# Patient Record
Sex: MALE | Race: WHITE | NOT HISPANIC OR LATINO | Employment: UNEMPLOYED | URBAN - NONMETROPOLITAN AREA
[De-identification: names, ages, dates, MRNs, and addresses within clinical notes are randomized per-mention and may not be internally consistent; named-entity substitution may affect disease eponyms.]

---

## 2017-08-01 ENCOUNTER — TRANSCRIBE ORDERS (OUTPATIENT)
Dept: ADMINISTRATIVE | Facility: HOSPITAL | Age: 52
End: 2017-08-01

## 2017-08-01 DIAGNOSIS — K21.00 REFLUX ESOPHAGITIS: ICD-10-CM

## 2017-08-01 DIAGNOSIS — R13.12 OROPHARYNGEAL DYSPHAGIA: Primary | ICD-10-CM

## 2017-08-01 DIAGNOSIS — R11.0 NAUSEA: ICD-10-CM

## 2017-08-07 ENCOUNTER — HOSPITAL ENCOUNTER (OUTPATIENT)
Dept: GENERAL RADIOLOGY | Facility: HOSPITAL | Age: 52
Discharge: HOME | End: 2017-08-07

## 2017-08-07 ENCOUNTER — HOSPITAL ENCOUNTER (OUTPATIENT)
Dept: GENERAL RADIOLOGY | Facility: HOSPITAL | Age: 52
Discharge: HOME | End: 2017-08-07
Admitting: GENERAL PRACTICE

## 2017-08-07 DIAGNOSIS — R13.12 OROPHARYNGEAL DYSPHAGIA: ICD-10-CM

## 2017-08-07 DIAGNOSIS — K21.00 REFLUX ESOPHAGITIS: ICD-10-CM

## 2017-08-07 DIAGNOSIS — R11.0 NAUSEA: ICD-10-CM

## 2017-08-07 PROCEDURE — 74246 X-RAY XM UPR GI TRC 2CNTRST: CPT

## 2017-09-18 ENCOUNTER — OFFICE VISIT (OUTPATIENT)
Dept: NEUROSURGERY | Age: 52
End: 2017-09-18
Payer: COMMERCIAL

## 2017-09-18 VITALS
HEIGHT: 74 IN | DIASTOLIC BLOOD PRESSURE: 67 MMHG | BODY MASS INDEX: 26.82 KG/M2 | OXYGEN SATURATION: 98 % | WEIGHT: 209 LBS | SYSTOLIC BLOOD PRESSURE: 107 MMHG | HEART RATE: 60 BPM

## 2017-09-18 DIAGNOSIS — M54.2 NECK PAIN: ICD-10-CM

## 2017-09-18 DIAGNOSIS — R13.10 DYSPHAGIA, UNSPECIFIED TYPE: ICD-10-CM

## 2017-09-18 DIAGNOSIS — R13.10 DYSPHAGIA, UNSPECIFIED TYPE: Primary | ICD-10-CM

## 2017-09-18 DIAGNOSIS — R53.1 WEAKNESS: ICD-10-CM

## 2017-09-18 LAB
ALBUMIN SERPL-MCNC: 4.1 G/DL (ref 3.5–5.2)
ALP BLD-CCNC: 79 U/L (ref 40–130)
ALT SERPL-CCNC: 19 U/L (ref 5–41)
ANION GAP SERPL CALCULATED.3IONS-SCNC: 16 MMOL/L (ref 7–19)
AST SERPL-CCNC: 14 U/L (ref 5–40)
BASOPHILS ABSOLUTE: 0 K/UL (ref 0–0.2)
BASOPHILS RELATIVE PERCENT: 0.2 % (ref 0–1)
BILIRUB SERPL-MCNC: 0.3 MG/DL (ref 0.2–1.2)
BUN BLDV-MCNC: 14 MG/DL (ref 6–20)
C-REACTIVE PROTEIN: 0.24 MG/DL (ref 0–0.5)
CALCIUM SERPL-MCNC: 9 MG/DL (ref 8.6–10)
CHLORIDE BLD-SCNC: 103 MMOL/L (ref 98–111)
CO2: 25 MMOL/L (ref 22–29)
CREAT SERPL-MCNC: 1.1 MG/DL (ref 0.5–1.2)
EOSINOPHILS ABSOLUTE: 0.4 K/UL (ref 0–0.6)
EOSINOPHILS RELATIVE PERCENT: 3.5 % (ref 0–5)
GFR NON-AFRICAN AMERICAN: >60
GLUCOSE BLD-MCNC: 76 MG/DL (ref 74–109)
HCT VFR BLD CALC: 44.4 % (ref 42–52)
HEMOGLOBIN: 14.9 G/DL (ref 14–18)
LYMPHOCYTES ABSOLUTE: 4 K/UL (ref 1.1–4.5)
LYMPHOCYTES RELATIVE PERCENT: 38.1 % (ref 20–40)
MCH RBC QN AUTO: 30.8 PG (ref 27–31)
MCHC RBC AUTO-ENTMCNC: 33.6 G/DL (ref 33–37)
MCV RBC AUTO: 91.7 FL (ref 80–94)
MONOCYTES ABSOLUTE: 0.7 K/UL (ref 0–0.9)
MONOCYTES RELATIVE PERCENT: 6.9 % (ref 0–10)
NEUTROPHILS ABSOLUTE: 5.3 K/UL (ref 1.5–7.5)
NEUTROPHILS RELATIVE PERCENT: 50.5 % (ref 50–65)
PDW BLD-RTO: 12.6 % (ref 11.5–14.5)
PLATELET # BLD: 209 K/UL (ref 130–400)
PMV BLD AUTO: 10.4 FL (ref 9.4–12.4)
POTASSIUM SERPL-SCNC: 4.3 MMOL/L (ref 3.5–5)
RBC # BLD: 4.84 M/UL (ref 4.7–6.1)
RHEUMATOID FACTOR: <10 IU/ML
SODIUM BLD-SCNC: 144 MMOL/L (ref 136–145)
T4 FREE: 1 NG/DL (ref 0.9–1.7)
TOTAL CK: 79 U/L (ref 39–308)
TOTAL PROTEIN: 7 G/DL (ref 6.6–8.7)
TSH SERPL DL<=0.05 MIU/L-ACNC: 4.06 UIU/ML (ref 0.27–4.2)
VITAMIN B-12: 368 PG/ML (ref 211–946)
WBC # BLD: 10.5 K/UL (ref 4.8–10.8)

## 2017-09-18 PROCEDURE — 99204 OFFICE O/P NEW MOD 45 MIN: CPT | Performed by: PSYCHIATRY & NEUROLOGY

## 2017-09-18 RX ORDER — HYDROCODONE BITARTRATE AND ACETAMINOPHEN 7.5; 325 MG/1; MG/1
1 TABLET ORAL PRN
COMMUNITY

## 2017-09-18 RX ORDER — PANTOPRAZOLE SODIUM 40 MG/1
40 TABLET, DELAYED RELEASE ORAL DAILY
COMMUNITY
Start: 2017-08-10

## 2017-09-18 RX ORDER — DICYCLOMINE HCL 20 MG
20 TABLET ORAL EVERY 6 HOURS
COMMUNITY
Start: 2017-07-21

## 2017-09-18 RX ORDER — DIAZEPAM 5 MG/1
5 TABLET ORAL EVERY 12 HOURS PRN
COMMUNITY
Start: 2017-08-23

## 2017-09-18 RX ORDER — PROMETHAZINE HYDROCHLORIDE 25 MG/1
25 TABLET ORAL EVERY 8 HOURS PRN
COMMUNITY
Start: 2017-08-23

## 2017-09-18 RX ORDER — ATENOLOL 100 MG/1
100 TABLET ORAL DAILY
COMMUNITY
Start: 2017-08-10

## 2017-09-20 LAB
ANA IGG, ELISA: NORMAL
ARSENIC BLOOD: <10 UG/L (ref 0–13)
LEAD LEVEL BLOOD: <2 UG/DL (ref 0–4.9)
MERCURY BLOOD: <3 UG/L (ref 0–10)

## 2017-10-06 LAB
ACETYLCHOLINE BINDING ANTIBODY: 0 NMOL/L (ref 0–0.4)
ACETYLCHOLINE BLOCKING AB: 20 % (ref 0–26)

## 2017-10-10 ENCOUNTER — TELEPHONE (OUTPATIENT)
Dept: NEUROLOGY | Age: 52
End: 2017-10-10

## 2017-10-10 NOTE — TELEPHONE ENCOUNTER
Patient called and advised that the hospital called him to let him know that his Insurance has denied the MRI scheduled for 10/12/17. Patient has asked if Dr. Kyle Garcia can appeal this denial. Could you please call the patient back to discuss, thank you.

## 2017-10-11 ENCOUNTER — TELEPHONE (OUTPATIENT)
Dept: NEUROLOGY | Age: 52
End: 2017-10-11

## 2017-10-12 ENCOUNTER — HOSPITAL ENCOUNTER (OUTPATIENT)
Dept: MRI IMAGING | Age: 52
Discharge: HOME | End: 2017-10-12
Payer: COMMERCIAL

## 2017-10-12 ENCOUNTER — HOSPITAL ENCOUNTER (OUTPATIENT)
Dept: NEUROLOGY | Age: 52
Discharge: HOME | End: 2017-10-12
Payer: COMMERCIAL

## 2017-10-12 DIAGNOSIS — M54.2 NECK PAIN: ICD-10-CM

## 2017-10-12 DIAGNOSIS — R53.1 WEAKNESS: ICD-10-CM

## 2017-10-12 DIAGNOSIS — R13.10 DYSPHAGIA, UNSPECIFIED TYPE: ICD-10-CM

## 2017-10-12 PROCEDURE — A9577 INJ MULTIHANCE: HCPCS | Performed by: PSYCHIATRY & NEUROLOGY

## 2017-10-12 PROCEDURE — 95910 NRV CNDJ TEST 7-8 STUDIES: CPT

## 2017-10-12 PROCEDURE — 72141 MRI NECK SPINE W/O DYE: CPT

## 2017-10-12 PROCEDURE — 70553 MRI BRAIN STEM W/O & W/DYE: CPT

## 2017-10-12 PROCEDURE — 95910 NRV CNDJ TEST 7-8 STUDIES: CPT | Performed by: PSYCHIATRY & NEUROLOGY

## 2017-10-12 PROCEDURE — 95886 MUSC TEST DONE W/N TEST COMP: CPT

## 2017-10-12 PROCEDURE — 6360000004 HC RX CONTRAST MEDICATION: Performed by: PSYCHIATRY & NEUROLOGY

## 2017-10-12 PROCEDURE — 95886 MUSC TEST DONE W/N TEST COMP: CPT | Performed by: PSYCHIATRY & NEUROLOGY

## 2017-10-12 RX ADMIN — GADOBENATE DIMEGLUMINE 20 ML: 529 INJECTION, SOLUTION INTRAVENOUS at 15:04

## 2017-10-12 NOTE — TELEPHONE ENCOUNTER
Called patient to confirm MRI denial. Patient states that when he received a call from [facility] to remind him of his test today, they informed him that it was denied. Patient states that he is due to have the test scheduled at 1:30pm today, which is after his 12:30pm nerve conduction study. Patient asked if I could help get this straightened out. I voiced yes, I will call the scheduling department and get some further insight and call him back, hopefully within the hour. Patient voiced ok and thank you. Dialed extension [phone]; [facility] pre access department. Spoke with Denis Muniz. I explained to her the situation and she stated that the patient tests are still on the schedule, she voiced that she cannot tell me whether or not they have been denied, I voiced thank you for your help. Called the patient back and provided him with this information. I advised the patient to go ahead and proceed with his plans to have this test performed and of he runs into any barriers along the way he can call the office and ask for me. Patient voiced ok.

## 2017-10-16 ENCOUNTER — TELEPHONE (OUTPATIENT)
Dept: NEUROLOGY | Age: 52
End: 2017-10-16

## 2017-10-16 NOTE — TELEPHONE ENCOUNTER
Patient had MRI's done and would like results. Patients insurance did not approve MRI's and patient is needing us to try and get it approved through insurance.

## 2017-11-01 ENCOUNTER — OFFICE VISIT (OUTPATIENT)
Dept: NEUROSURGERY | Age: 52
End: 2017-11-01
Payer: COMMERCIAL

## 2017-11-01 VITALS
WEIGHT: 214 LBS | DIASTOLIC BLOOD PRESSURE: 66 MMHG | OXYGEN SATURATION: 98 % | BODY MASS INDEX: 27.46 KG/M2 | HEART RATE: 58 BPM | SYSTOLIC BLOOD PRESSURE: 103 MMHG | HEIGHT: 74 IN

## 2017-11-01 DIAGNOSIS — M54.2 NECK PAIN: ICD-10-CM

## 2017-11-01 DIAGNOSIS — R13.10 DYSPHAGIA, UNSPECIFIED TYPE: Primary | ICD-10-CM

## 2017-11-01 DIAGNOSIS — R53.1 WEAKNESS: ICD-10-CM

## 2017-11-01 PROCEDURE — 99214 OFFICE O/P EST MOD 30 MIN: CPT | Performed by: PSYCHIATRY & NEUROLOGY

## 2017-11-01 NOTE — PROGRESS NOTES
[facility] Neurology Office Note      Patient:   Rachel Gibson  MR#:    [id number]  Account Number:                         YOB: 1965  Date of Evaluation:  11/1/2017  Time of Note:                          9:12 AM  Primary/Referring Physician:  JOVANA Valle   Consulting Physician:  Adams Post D.O.    FOLLOW UP VISIT    Chief Complaint   Patient presents with    Follow-up     6 week follow up for dysphagia, pt states that he is still having alot of trouble with swallowing    Neck Pain     pt states that he is still having alot of neck of pain       HISTORY OF Augusto Davies is a 47y.o. year old male here for possible dysphagia. Patient noting dysphagia, some difficulty with swallowing over the last few months. No overt speech changes. Does note some intermittent dizziness and nausea. No diplopia. No ptosis. Some neck pain noted, and possible right UE weakness intermittently but has not worsened. Has noted some possible fasciculations in the the upper extremities. He is s/p EGD and helped initially and then symptoms returned. Valium and phenergan seem to help. Workup completed and reviewed as below. No new complaints today. Past Medical History:   Diagnosis Date    Anxiety     GERD (gastroesophageal reflux disease)     Hypertension        Past Surgical History:   Procedure Laterality Date    BACK SURGERY      UPPER GASTROINTESTINAL ENDOSCOPY         Family History   Problem Relation Age of Onset    Diabetes Maternal Aunt     Diabetes Maternal Uncle     Cancer Paternal Aunt     Cancer Paternal Uncle     Cancer Paternal Grandmother        Social History     Social History    Marital status: Unknown     Spouse name: N/A    Number of children: N/A    Years of education: N/A     Occupational History    Not on file. Social History Main Topics    Smoking status: Former Smoker    Smokeless tobacco: Never Used    Alcohol use No    Drug use:  No  Sexual activity: Yes     Other Topics Concern    Not on file     Social History Narrative    No narrative on file       Current Outpatient Prescriptions   Medication Sig Dispense Refill    promethazine (PHENERGAN) 25 MG tablet 25 mg every 8 hours as needed       atenolol (TENORMIN) 100 MG tablet 100 mg daily       diazepam (VALIUM) 5 MG tablet 5 mg every 12 hours as needed       HYDROcodone-acetaminophen (NORCO) 7.5-325 MG per tablet Take 1 tablet by mouth as needed for Pain .  dicyclomine (BENTYL) 20 MG tablet 20 mg every 6 hours       pantoprazole (PROTONIX) 40 MG tablet 40 mg daily        No current facility-administered medications for this visit.       ALLERGIES  No Known Allergies      REVIEW OF SYSTEMS    Constitutional: []Fever []Sweats []Chills [] Recent Injury []Fatigue  [x] Denies all unless marked  HEENT:[]Headache  [] Head Injury  [] Sore Throat  [] Ear Pain  [x]Dizziness [] Hearing Loss []Trouble Swallowing []Voice Change  [] Eye Pain  [] Eye Injection []Visual Disturbance  [] Ptosis  [] Tinnitus [x] Denies all unless marked  Spine:  [x] Neck pain  [x] Back pain  [] Sciaticia  [x] Denies all unless marked  Cardiovascular:[]Chest Pain []Palpitations [] Heart Disease  [x] Denies all unless marked  Pulmonary: [x]Shortness of Breath []Cough  []Wheezing  [x] Denies all unless marked  Gastrointestinal:  []Abdominal Pain  []Blood in Stool  []Diarrhea []Constipation [x]Nausea  []Vomiting  [x] Denies all unless marked  Genitourinary:  [] Dysuria [] Enuresis [] Incontinence [] Frequency/Urgency  [] Hematuria  [x] Denies all unless marked  Musculoskeletal: [] Joint Pain [x] Myalgias [x] Joint Swelling [] Neck Stiffness  [x] Denies all unless marked  Skin:[] Rash [] Pallor [] Color Change [] Wound  [x] Denies all unless marked  Neurological:[] Visual Disturbance [] Double Vision [] Slurred Speech [x] Trouble swallowing  [] Vertigo [] Tingling [] Numbness [] Weakness [x] Loss of Balance [] Loss of Consciousness [] Memory Loss [x] Tremor [] Seizure [] Syncope  [] Ataxia  [x] Denies all unless marked  Psychiatric/Behavioral:[] Depression [x] Anxiety [] Confusion [] Agitation [] Behavior Problems  [] Hallucinations  [] Suicidal idiation  [x] Denies all unless marked  Sleep: []  Insomnia [] Sleep Disturbance [] Snoring [] Restless Legs [] Daytime Sleeping [] Sleep Apnea  [x] Denies all unless marked  Hematological:[] Adenopathy [] Bruises/Bleeds Easily  [x] Denies all unless marked  Endocrine: [] Cold Intolerance [] Heat Intolerance [] Polydipsia [] Polyphagia [] Polyuria  [x]Denies all unless marked  Allergic/Immunologic:[] Environmental Allergies [] Food Allergies [] Immunocompromised state  [x] Denies all unless marked    PHYSICAL EXAM  /66 (Site: Right Arm, Position: Sitting, Cuff Size: Large Adult)   Pulse 58   Ht 6' 2\" (1.88 m)   Wt 214 lb (97.1 kg)   SpO2 98%   BMI 27.48 kg/m²     Constitutional  No acute distress    HEENT- Conjunctiva normal.  No scars, masses, or lesions over external nose or ears  Musculoskeletal  No significant wasting of muscles noted, no bony deformities  Extremities - No clubbing, cyanosis or edema  Skin  Warm, dry, and intact. No rash, erythema, or pallor  Psychiatric  Mood, affect, and behavior appear normal      NEUROLOGICAL EXAM    Mental status   [x]Awake, alert, oriented   [x]Affect attention and concentration appear appropriate  [x]Recent and remote memory appears unremarkable  [x]Speech normal without dysarthria or aphasia, comprehension and repetition intact.    COMMENTS:    Cranial Nerves [x]No VF deficit to confrontation,  no papilledema on fundoscopic exam.  [x]PERRLA, EOMI, no nystagmus, conjugate eye movements, no ptosis  [x]Face symmetric  [x]Facial sensation intact  [x]Tongue midline no atrophy or fasciculations present  [x]Palate midline, hearing to finger rub normal bilaterally  [x]Shoulder shrug and SCM testing normal bilaterally  COMMENTS:   Motor [x]5/5 strength x 4 extremities  [x]Normal bulk and tone  [x]No tremor present  [x]No rigidity or bradykinesia noted  COMMENTS:   Sensory  [x]Sensation intact to light touch, pin prick, vibration, and proprioception BLE  [x]Sensation intact to light touch, pin prick, vibration, and proprioception BUE  COMMENTS:   Coordination [x]FTN normal bilaterally   [x]HTS normal bilaterally  [x]TRENT normal bilaterally. COMMENTS:   Reflexes  [x]Symmetric and non-pathological  [x]Toes down going bilaterally  [x]No clonus present  COMMENTS:   Gait                  [x]Normal steady gait    []Ataxic    []Spastic     []Magnetic     []Shuffling  COMMENTS:       LABS RECORD AND IMAGING REVIEW (As below and per HPI)    Records reviewed. MRI Brain negative. MRI C-spine with ddd, severe spinal stenosis noted. Anterior osteophytes noted as well. ASSESSMENT:    Saran Mar is a 47y.o. year old male here for dysphagia, neck pain, and intermittent UE weakness. MRI negative for central source fore dysphagia. MRI C-spine with severe spinal stenosis and anterior osteophytes noted, question if may be playing a role. Labs and NCS/EMG negative for neuromuscular source, MG antibodies negative, no evidence of MND on EMG. His exam is not overtly suggestive of ALS currently. PLAN:  1. Referral to neurosurgery for C-spine stenosis. 2.  Continue follow up with GI.   3.  Will follow here.      Nely Benites DO  Board Certified Neurology

## 2017-11-02 ENCOUNTER — TELEPHONE (OUTPATIENT)
Dept: NEUROSURGERY | Age: 52
End: 2017-11-02

## 2017-11-07 ENCOUNTER — TELEPHONE (OUTPATIENT)
Dept: NEUROLOGY | Age: 52
End: 2017-11-07

## 2017-11-15 ENCOUNTER — APPOINTMENT (OUTPATIENT)
Dept: PREADMISSION TESTING | Facility: HOSPITAL | Age: 52
End: 2017-11-15

## 2017-11-15 ENCOUNTER — HOSPITAL ENCOUNTER (OUTPATIENT)
Dept: GENERAL RADIOLOGY | Facility: HOSPITAL | Age: 52
Discharge: HOME | End: 2017-11-15
Admitting: ORTHOPAEDIC SURGERY

## 2017-11-15 VITALS
BODY MASS INDEX: 28.31 KG/M2 | SYSTOLIC BLOOD PRESSURE: 105 MMHG | DIASTOLIC BLOOD PRESSURE: 54 MMHG | WEIGHT: 213.6 LBS | RESPIRATION RATE: 18 BRPM | OXYGEN SATURATION: 99 % | HEIGHT: 73 IN | HEART RATE: 52 BPM

## 2017-11-15 LAB
ALBUMIN SERPL-MCNC: 4.1 G/DL (ref 3.5–5)
ALBUMIN/GLOB SERPL: 1.4 G/DL (ref 1.1–2.5)
ALP SERPL-CCNC: 61 U/L (ref 24–120)
ALT SERPL W P-5'-P-CCNC: 35 U/L (ref 0–54)
ANION GAP SERPL CALCULATED.3IONS-SCNC: 10 MMOL/L (ref 4–13)
APTT PPP: 23.8 SECONDS (ref 24.1–34.8)
AST SERPL-CCNC: 25 U/L (ref 7–45)
BASOPHILS # BLD AUTO: 0.01 10*3/MM3 (ref 0–0.2)
BASOPHILS NFR BLD AUTO: 0.1 % (ref 0–2)
BILIRUB SERPL-MCNC: 0.5 MG/DL (ref 0.1–1)
BILIRUB UR QL STRIP: NEGATIVE
BUN BLD-MCNC: 15 MG/DL (ref 5–21)
BUN/CREAT SERPL: 12.1 (ref 7–25)
CALCIUM SPEC-SCNC: 9.2 MG/DL (ref 8.4–10.4)
CHLORIDE SERPL-SCNC: 102 MMOL/L (ref 98–110)
CLARITY UR: CLEAR
CO2 SERPL-SCNC: 31 MMOL/L (ref 24–31)
COLOR UR: YELLOW
CREAT BLD-MCNC: 1.24 MG/DL (ref 0.5–1.4)
DEPRECATED RDW RBC AUTO: 42.7 FL (ref 40–54)
EOSINOPHIL # BLD AUTO: 0.21 10*3/MM3 (ref 0–0.7)
EOSINOPHIL NFR BLD AUTO: 2.7 % (ref 0–4)
ERYTHROCYTE [DISTWIDTH] IN BLOOD BY AUTOMATED COUNT: 13.1 % (ref 12–15)
GFR SERPL CREATININE-BSD FRML MDRD: 61 ML/MIN/1.73
GLOBULIN UR ELPH-MCNC: 2.9 GM/DL
GLUCOSE BLD-MCNC: 83 MG/DL (ref 70–100)
GLUCOSE UR STRIP-MCNC: NEGATIVE MG/DL
HCT VFR BLD AUTO: 42.6 % (ref 40–52)
HGB BLD-MCNC: 14.6 G/DL (ref 14–18)
HGB UR QL STRIP.AUTO: NEGATIVE
IMM GRANULOCYTES # BLD: 0.03 10*3/MM3 (ref 0–0.03)
IMM GRANULOCYTES NFR BLD: 0.4 % (ref 0–5)
INR PPP: 0.97 (ref 0.91–1.09)
KETONES UR QL STRIP: NEGATIVE
LEUKOCYTE ESTERASE UR QL STRIP.AUTO: NEGATIVE
LYMPHOCYTES # BLD AUTO: 3.46 10*3/MM3 (ref 0.72–4.86)
LYMPHOCYTES NFR BLD AUTO: 44.5 % (ref 15–45)
MCH RBC QN AUTO: 30.5 PG (ref 28–32)
MCHC RBC AUTO-ENTMCNC: 34.3 G/DL (ref 33–36)
MCV RBC AUTO: 88.9 FL (ref 82–95)
MONOCYTES # BLD AUTO: 0.7 10*3/MM3 (ref 0.19–1.3)
MONOCYTES NFR BLD AUTO: 9 % (ref 4–12)
NEUTROPHILS # BLD AUTO: 3.37 10*3/MM3 (ref 1.87–8.4)
NEUTROPHILS NFR BLD AUTO: 43.3 % (ref 39–78)
NITRITE UR QL STRIP: NEGATIVE
PH UR STRIP.AUTO: <=5 [PH] (ref 5–8)
PLATELET # BLD AUTO: 159 10*3/MM3 (ref 130–400)
PMV BLD AUTO: 11.2 FL (ref 6–12)
POTASSIUM BLD-SCNC: 4.8 MMOL/L (ref 3.5–5.3)
PROT SERPL-MCNC: 7 G/DL (ref 6.3–8.7)
PROT UR QL STRIP: NEGATIVE
PROTHROMBIN TIME: 13.2 SECONDS (ref 11.9–14.6)
RBC # BLD AUTO: 4.79 10*6/MM3 (ref 4.8–5.9)
SODIUM BLD-SCNC: 143 MMOL/L (ref 135–145)
SP GR UR STRIP: 1.01 (ref 1–1.03)
UROBILINOGEN UR QL STRIP: NORMAL
WBC NRBC COR # BLD: 7.78 10*3/MM3 (ref 4.8–10.8)

## 2017-11-15 PROCEDURE — 85025 COMPLETE CBC W/AUTO DIFF WBC: CPT | Performed by: ORTHOPAEDIC SURGERY

## 2017-11-15 PROCEDURE — 85730 THROMBOPLASTIN TIME PARTIAL: CPT | Performed by: ORTHOPAEDIC SURGERY

## 2017-11-15 PROCEDURE — 85610 PROTHROMBIN TIME: CPT | Performed by: ORTHOPAEDIC SURGERY

## 2017-11-15 PROCEDURE — 71010 HC CHEST PA OR AP: CPT

## 2017-11-15 PROCEDURE — 93005 ELECTROCARDIOGRAM TRACING: CPT

## 2017-11-15 PROCEDURE — 93010 ELECTROCARDIOGRAM REPORT: CPT | Performed by: INTERNAL MEDICINE

## 2017-11-15 PROCEDURE — 81003 URINALYSIS AUTO W/O SCOPE: CPT | Performed by: ORTHOPAEDIC SURGERY

## 2017-11-15 PROCEDURE — 80053 COMPREHEN METABOLIC PANEL: CPT | Performed by: ORTHOPAEDIC SURGERY

## 2017-11-15 RX ORDER — HYDROCODONE BITARTRATE AND ACETAMINOPHEN 7.5; 325 MG/1; MG/1
1 TABLET ORAL DAILY PRN
COMMUNITY
End: 2017-11-21 | Stop reason: HOSPADM

## 2017-11-15 RX ORDER — DIAZEPAM 5 MG/1
5 TABLET ORAL 2 TIMES DAILY
COMMUNITY
End: 2017-11-21 | Stop reason: HOSPADM

## 2017-11-15 RX ORDER — SALSALATE 500 MG
500 TABLET ORAL 2 TIMES DAILY PRN
COMMUNITY
End: 2017-11-21 | Stop reason: HOSPADM

## 2017-11-15 RX ORDER — ATENOLOL 100 MG/1
100 TABLET ORAL DAILY
COMMUNITY

## 2017-11-15 NOTE — DISCHARGE INSTRUCTIONS
DAY OF SURGERY INSTRUCTIONS        YOUR SURGEON: JD LAWLER     PROCEDURE: CORPECTOMY CERVICAL 5, PARTIAL CORPECTOMY CERVICAL 4, ANTERIOR CERVICAL DISCECTOMY FUSION CERVICAL 6-7, ANTERIOR FUSION WITH INSTRUMENTATION CERVICAL 4-7    DATE OF SURGERY: November 20, 2017     ARRIVAL TIME: AS DIRECTED BY OFFICE    DAY OF SURGERY TAKE ONLY THESE MEDICATIONS: ATENOLOL         BEFORE YOU COME TO THE HOSPITAL  (Pre-op instructions)  • Do not eat, drink, smoke or chew gum after midnight the night before surgery.  This also includes no mints.  • Morning of surgery take only the medicines you have been instructed with a sip of water unless otherwise instructed  by your physician.  • Do not shave, wear makeup or dark nail polish.  • Remove all jewelry including rings.  • Leave anything you consider valuable at home.  • Leave your suitcase in the car until after your surgery.  • Bring the following with you if applicable:  o Picture ID and insurance, Medicare or Medicaid cards  o Co-pay/deductible required by insurance (cash, check, credit card)  o Copy of advance directive, living will or power-of- documents if not brought to PAT  o CPAP or BIPAP mask and tubing  o Relaxation aids (MP3 player, book, magazine)  • On the day of surgery check in at registration located at the main entrance of the hospital.       Outpatient Surgery Guidelines, Adult  Outpatient procedures are those for which the person having the procedure is allowed to go home the same day as the procedure. Various procedures are done on an outpatient basis. You should follow some general guidelines if you will be having an outpatient procedure.  LET YOUR HEALTH CARE PROVIDER KNOW ABOUT:  · Any allergies you have.  · All medicines you are taking, including vitamins, herbs, eye drops, creams, and over-the-counter medicines.  · Previous problems you or members of your family have had with the use of anesthetics.  · Any blood disorders you  have.  · Previous surgeries you have had.  · Medical conditions you have.  RISKS AND COMPLICATIONS  Your health care provider will discuss possible risks and complications with you before surgery. Common risks and complications include:    · Problems due to the use of anesthetics.  · Blood loss and replacement (does not apply to minor surgical procedures).  · Temporary increase in pain due to surgery.  · Uncorrected pain or problems that the surgery was meant to correct.  · Infection.  · New damage.  BEFORE THE PROCEDURE  · Ask your health care provider about changing or stopping your regular medicines. You may need to stop taking certain medicines in the days or weeks before the procedure.  · Stop smoking at least 2 weeks before surgery. This lowers your risk for complications during and after surgery. Ask your health care provider for help with this if needed.  · Eat your usual meals and a light supper the day before surgery. Continue fluid intake. Do not drink alcohol.  · Do not eat or drink after midnight the night before your surgery.   · Arrange for someone to take you home and to stay with you for 24 hours after the procedure. Medicine given for your procedure may affect your ability to drive or to care for yourself.  · Call your health care provider's office if you develop an illness or problem that may prevent you from safely having your procedure.  AFTER THE PROCEDURE  After surgery, you will be taken to a recovery area, where your progress will be monitored. If there are no complications, you will be allowed to go home when you are awake, stable, and taking fluids well. You may have numbness around the surgical site. Healing will take some time. You will have tenderness at the surgical site and may have some swelling and bruising. You may also have some nausea.  HOME CARE INSTRUCTIONS  · Do not drive for 24 hours, or as directed by your health care provider. Do not drive while taking prescription pain  medicines.  · Do not drink alcohol for 24 hours.  · Do not make important decisions or sign legal documents for 24 hours.  · You may resume a normal diet and activities as directed.  · Do not lift anything heavier than 10 pounds (4.5 kg) or play contact sports until your health care provider says it is okay.  · Change your bandages (dressings) as directed.  · Only take over-the-counter or prescription medicines as directed by your health care provider.  · Follow up with your health care provider as directed.  SEEK MEDICAL CARE IF:  · You have increased bleeding (more than a small spot) from the surgical site.  · You have redness, swelling, or increasing pain in the wound.  · You see pus coming from the wound.  · You have a fever.  · You notice a bad smell coming from the wound or dressing.  · You feel lightheaded or faint.  · You develop a rash.  · You have trouble breathing.  · You develop allergies.  MAKE SURE YOU:  · Understand these instructions.  · Will watch your condition.  · Will get help right away if you are not doing well or get worse.     This information is not intended to replace advice given to you by your health care provider. Make sure you discuss any questions you have with your health care provider.     Document Released: 09/12/2002 Document Revised: 05/03/2016 Document Reviewed: 05/22/2014  [employer] Interactive Patient Education ©2016 [employer]       Fall Prevention in Hospitals, Adult  As a hospital patient, your condition and the treatments you receive can increase your risk for falls. Some additional risk factors for falls in a hospital include:  · Being in an unfamiliar environment.  · Being on bed rest.  · Your surgery.  · Taking certain medicines.  · Your tubing requirements, such as intravenous (IV) therapy or catheters.  It is important that you learn how to decrease fall risks while at the hospital. Below are important tips that can help prevent falls.  SAFETY TIPS FOR PREVENTING  FALLS  Talk about your risk of falling.  · Ask your health care provider why you are at risk for falling. Is it your medicine, illness, tubing placement, or something else?  · Make a plan with your health care provider to keep you safe from falls.  · Ask your health care provider or pharmacist about side effects of your medicines. Some medicines can make you dizzy or affect your coordination.  Ask for help.  · Ask for help before getting out of bed. You may need to press your call button.  · Ask for assistance in getting safely to the toilet.  · Ask for a walker or cane to be put at your bedside. Ask that most of the side rails on your bed be placed up before your health care provider leaves the room.  · Ask family or friends to sit with you.  · Ask for things that are out of your reach, such as your glasses, hearing aids, telephone, bedside table, or call button.  Follow these tips to avoid falling:  · Stay lying or seated, rather than standing, while waiting for help.  · Wear rubber-soled slippers or shoes whenever you walk in the hospital.  · Avoid quick, sudden movements.  ¨ Change positions slowly.  ¨ Sit on the side of your bed before standing.  ¨ Stand up slowly and wait before you start to walk.  · Let your health care provider know if there is a spill on the floor.  · Pay careful attention to the medical equipment, electrical cords, and tubes around you.  · When you need help, use your call button by your bed or in the bathroom. Wait for one of your health care providers to help you.  · If you feel dizzy or unsure of your footing, return to bed and wait for assistance.  · Avoid being distracted by the TV, telephone, or another person in your room.  · Do not lean or support yourself on rolling objects, such as IV poles or bedside tables.     This information is not intended to replace advice given to you by your health care provider. Make sure you discuss any questions you have with your health care  provider.     Document Released: 12/15/2001 Document Revised: 01/08/2016 Document Reviewed: 08/25/2013  [employer] Interactive Patient Education ©2016 [employer]       Surgical Site Infections FAQs  What is a Surgical Site Infection (SSI)?  A surgical site infection is an infection that occurs after surgery in the part of the body where the surgery took place. Most patients who have surgery do not develop an infection. However, infections develop in about 1 to 3 out of every 100 patients who have surgery.  Some of the common symptoms of a surgical site infection are:  · Redness and pain around the area where you had surgery  · Drainage of cloudy fluid from your surgical wound  · Fever  Can SSIs be treated?  Yes. Most surgical site infections can be treated with antibiotics. The antibiotic given to you depends on the bacteria (germs) causing the infection. Sometimes patients with SSIs also need another surgery to treat the infection.  What are some of the things that hospitals are doing to prevent SSIs?  To prevent SSIs, doctors, nurses, and other healthcare providers:  · Clean their hands and arms up to their elbows with an antiseptic agent just before the surgery.  · Clean their hands with soap and water or an alcohol-based hand rub before and after caring for each patient.  · May remove some of your hair immediately before your surgery using electric clippers if the hair is in the same area where the procedure will occur. They should not shave you with a razor.  · Wear special hair covers, masks, gowns, and gloves during surgery to keep the surgery area clean.  · Give you antibiotics before your surgery starts. In most cases, you should get antibiotics within 60 minutes before the surgery starts and the antibiotics should be stopped within 24 hours after surgery.  · Clean the skin at the site of your surgery with a special soap that kills germs.  What can I do to help prevent SSIs?  Before your surgery:  · Tell  your doctor about other medical problems you may have. Health problems such as allergies, diabetes, and obesity could affect your surgery and your treatment.  · Quit smoking. Patients who smoke get more infections. Talk to your doctor about how you can quit before your surgery.  · Do not shave near where you will have surgery. Shaving with a razor can irritate your skin and make it easier to develop an infection.  At the time of your surgery:  · Speak up if someone tries to shave you with a razor before surgery. Ask why you need to be shaved and talk with your surgeon if you have any concerns.  · Ask if you will get antibiotics before surgery.  After your surgery:  · Make sure that your healthcare providers clean their hands before examining you, either with soap and water or an alcohol-based hand rub.  · If you do not see your providers clean their hands, please ask them to do so.  · Family and friends who visit you should not touch the surgical wound or dressings.  · Family and friends should clean their hands with soap and water or an alcohol-based hand rub before and after visiting you. If you do not see them clean their hands, ask them to clean their hands.  What do I need to do when I go home from the hospital?  · Before you go home, your doctor or nurse should explain everything you need to know about taking care of your wound. Make sure you understand how to care for your wound before you leave the hospital.  · Always clean your hands before and after caring for your wound.  · Before you go home, make sure you know who to contact if you have questions or problems after you get home.  · If you have any symptoms of an infection, such as redness and pain at the surgery site, drainage, or fever, call your doctor immediately.  If you have additional questions, please ask your doctor or nurse.  Developed and co-sponsored by The Society for Healthcare Epidemiology of [place] (SHEA); Infectious Diseases Society of  [place] (IDSA); American Hospital Association; Association for Professionals in Infection Control and Epidemiology (APIC); Centers for Disease Control and Prevention (CDC); and The Joint Commission.     This information is not intended to replace advice given to you by your health care provider. Make sure you discuss any questions you have with your health care provider.     Document Released: 12/23/2014 Document Revised: 01/08/2016 Document Reviewed: 03/02/2016  [employer] Interactive Patient Education ©2016 [employer].       [facility]  CHG 4% Patient Instruction Sheet    Preparing the Skin Before Surgery  Preparing or “prepping” skin before surgery can reduce the risk of infection at the surgical site. To make the process easier,[facility] has chosen 4% Chlorhexidine Gluconate (CHG) antiseptic solution.   The steps below outline the prepping process and should be carefully followed.                                                                                                                                                      Prep the skin at the following time(s):                                                      We recommend you shower the night before surgery, and again the morning of surgery with the 4% CHG antiseptic solution using half of the bottle and a cloth each time.  Dress in clean clothes/sleepwear after showering.  See instructions below for application.          Do not apply any lotions or moisturizers.       Do not shave the area to be prepped for at least 2 days prior to surgery.    Clipping the hair may be done immediately prior to your surgery at the hospital    if needed.    Directions:  Thoroughly rinse your body with water.  Apply 4% CHG to a cloth and wash skin gently, paying special attention to the operative site.  Rinse again thoroughly.  Once you have begun using this product do not apply anything else to your skin. If itching or redness persists, rinse affected areas and  discontinue use.    When using this product:  • Keep out of eyes, ears, and mouth.  • If solution should contact these areas, rinse out promptly and thoroughly with water.  • For external use only.  • Do not use in genital area, on your face or head.      PATIENT/FAMILY/RESPONSIBLE PARTY VERBALIZES UNDERSTANDING OF ABOVE EDUCATION.  COPY OF PAIN SCALE GIVEN AND REVIEWED WITH VERBALIZED UNDERSTANDING.

## 2017-11-17 ENCOUNTER — ANESTHESIA EVENT (OUTPATIENT)
Dept: PERIOP | Facility: HOSPITAL | Age: 52
End: 2017-11-17

## 2017-11-17 RX ORDER — SODIUM CHLORIDE 0.9 % (FLUSH) 0.9 %
1-10 SYRINGE (ML) INJECTION AS NEEDED
Status: DISCONTINUED | OUTPATIENT
Start: 2017-11-17 | End: 2017-11-20 | Stop reason: SDUPTHER

## 2017-11-20 ENCOUNTER — HOSPITAL ENCOUNTER (INPATIENT)
Facility: HOSPITAL | Age: 52
LOS: 1 days | Discharge: HOME | End: 2017-11-21
Attending: ORTHOPAEDIC SURGERY | Admitting: ORTHOPAEDIC SURGERY

## 2017-11-20 ENCOUNTER — ANESTHESIA (OUTPATIENT)
Dept: PERIOP | Facility: HOSPITAL | Age: 52
End: 2017-11-20

## 2017-11-20 ENCOUNTER — APPOINTMENT (OUTPATIENT)
Dept: GENERAL RADIOLOGY | Facility: HOSPITAL | Age: 52
End: 2017-11-20

## 2017-11-20 DIAGNOSIS — Z78.9 IMPAIRED MOBILITY AND ADLS: ICD-10-CM

## 2017-11-20 DIAGNOSIS — Z74.09 IMPAIRED MOBILITY AND ADLS: ICD-10-CM

## 2017-11-20 PROBLEM — M48.02 STENOSIS, CERVICAL SPINE: Status: ACTIVE | Noted: 2017-11-20

## 2017-11-20 LAB
ABO GROUP BLD: NORMAL
BLD GP AB SCN SERPL QL: NEGATIVE
RH BLD: NEGATIVE

## 2017-11-20 PROCEDURE — G0378 HOSPITAL OBSERVATION PER HR: HCPCS

## 2017-11-20 PROCEDURE — 25010000002 SUCCINYLCHOLINE PER 20 MG: Performed by: NURSE ANESTHETIST, CERTIFIED REGISTERED

## 2017-11-20 PROCEDURE — 94760 N-INVAS EAR/PLS OXIMETRY 1: CPT

## 2017-11-20 PROCEDURE — 25010000002 DEXAMETHASONE PER 1 MG: Performed by: NURSE ANESTHETIST, CERTIFIED REGISTERED

## 2017-11-20 PROCEDURE — C1713 ANCHOR/SCREW BN/BN,TIS/BN: HCPCS | Performed by: ORTHOPAEDIC SURGERY

## 2017-11-20 PROCEDURE — 4A11X4G MONITORING OF PERIPHERAL NERVOUS ELECTRICAL ACTIVITY, INTRAOPERATIVE, EXTERNAL APPROACH: ICD-10-PCS | Performed by: ORTHOPAEDIC SURGERY

## 2017-11-20 PROCEDURE — 25010000002 ONDANSETRON PER 1 MG: Performed by: ANESTHESIOLOGY

## 2017-11-20 PROCEDURE — 25010000002 HYDROMORPHONE PER 4 MG: Performed by: ANESTHESIOLOGY

## 2017-11-20 PROCEDURE — 25010000002 MIDAZOLAM PER 1 MG: Performed by: ANESTHESIOLOGY

## 2017-11-20 PROCEDURE — 76000 FLUOROSCOPY <1 HR PHYS/QHP: CPT

## 2017-11-20 PROCEDURE — 0RT30ZZ RESECTION OF CERVICAL VERTEBRAL DISC, OPEN APPROACH: ICD-10-PCS | Performed by: ORTHOPAEDIC SURGERY

## 2017-11-20 PROCEDURE — 25010000002 ONDANSETRON PER 1 MG: Performed by: NURSE ANESTHETIST, CERTIFIED REGISTERED

## 2017-11-20 PROCEDURE — 25010000003 CEFAZOLIN PER 500 MG: Performed by: ORTHOPAEDIC SURGERY

## 2017-11-20 PROCEDURE — 86850 RBC ANTIBODY SCREEN: CPT | Performed by: ORTHOPAEDIC SURGERY

## 2017-11-20 PROCEDURE — 94799 UNLISTED PULMONARY SVC/PX: CPT

## 2017-11-20 PROCEDURE — 25010000002 PROPOFOL 1000 MG/ML EMULSION: Performed by: NURSE ANESTHETIST, CERTIFIED REGISTERED

## 2017-11-20 PROCEDURE — 94640 AIRWAY INHALATION TREATMENT: CPT

## 2017-11-20 PROCEDURE — 86901 BLOOD TYPING SEROLOGIC RH(D): CPT | Performed by: ORTHOPAEDIC SURGERY

## 2017-11-20 PROCEDURE — 86900 BLOOD TYPING SEROLOGIC ABO: CPT | Performed by: ORTHOPAEDIC SURGERY

## 2017-11-20 PROCEDURE — 25010000002 FENTANYL CITRATE (PF) 250 MCG/5ML SOLUTION: Performed by: NURSE ANESTHETIST, CERTIFIED REGISTERED

## 2017-11-20 PROCEDURE — 0RG20A0 FUSION OF 2 OR MORE CERVICAL VERTEBRAL JOINTS WITH INTERBODY FUSION DEVICE, ANTERIOR APPROACH, ANTERIOR COLUMN, OPEN APPROACH: ICD-10-PCS | Performed by: ORTHOPAEDIC SURGERY

## 2017-11-20 PROCEDURE — 72040 X-RAY EXAM NECK SPINE 2-3 VW: CPT

## 2017-11-20 PROCEDURE — 25010000002 DEXAMETHASONE PER 1 MG: Performed by: ANESTHESIOLOGY

## 2017-11-20 PROCEDURE — 25010000002 PROPOFOL 10 MG/ML EMULSION: Performed by: NURSE ANESTHETIST, CERTIFIED REGISTERED

## 2017-11-20 DEVICE — SCRW BONE MAXAN VA 4X14MM: Type: IMPLANTABLE DEVICE | Status: FUNCTIONAL

## 2017-11-20 DEVICE — SPACR PEEK COHERE 7D LRD 9X14X16MM: Type: IMPLANTABLE DEVICE | Status: FUNCTIONAL

## 2017-11-20 DEVICE — IMPLANTABLE DEVICE: Type: IMPLANTABLE DEVICE | Status: FUNCTIONAL

## 2017-11-20 RX ORDER — ONDANSETRON 2 MG/ML
4 INJECTION INTRAMUSCULAR; INTRAVENOUS EVERY 6 HOURS PRN
Status: DISCONTINUED | OUTPATIENT
Start: 2017-11-20 | End: 2017-11-20 | Stop reason: SDUPTHER

## 2017-11-20 RX ORDER — LIDOCAINE HYDROCHLORIDE 20 MG/ML
INJECTION, SOLUTION INFILTRATION; PERINEURAL AS NEEDED
Status: DISCONTINUED | OUTPATIENT
Start: 2017-11-20 | End: 2017-11-20 | Stop reason: SURG

## 2017-11-20 RX ORDER — DIAZEPAM 5 MG/1
5 TABLET ORAL EVERY 12 HOURS SCHEDULED
Status: DISCONTINUED | OUTPATIENT
Start: 2017-11-20 | End: 2017-11-20

## 2017-11-20 RX ORDER — ONDANSETRON 2 MG/ML
4 INJECTION INTRAMUSCULAR; INTRAVENOUS EVERY 6 HOURS PRN
Status: DISCONTINUED | OUTPATIENT
Start: 2017-11-20 | End: 2017-11-21 | Stop reason: HOSPADM

## 2017-11-20 RX ORDER — IPRATROPIUM BROMIDE AND ALBUTEROL SULFATE 2.5; .5 MG/3ML; MG/3ML
3 SOLUTION RESPIRATORY (INHALATION) ONCE AS NEEDED
Status: DISCONTINUED | OUTPATIENT
Start: 2017-11-20 | End: 2017-11-20 | Stop reason: HOSPADM

## 2017-11-20 RX ORDER — HYDRALAZINE HYDROCHLORIDE 20 MG/ML
5 INJECTION INTRAMUSCULAR; INTRAVENOUS
Status: DISCONTINUED | OUTPATIENT
Start: 2017-11-20 | End: 2017-11-20 | Stop reason: HOSPADM

## 2017-11-20 RX ORDER — SUCCINYLCHOLINE CHLORIDE 20 MG/ML
INJECTION INTRAMUSCULAR; INTRAVENOUS AS NEEDED
Status: DISCONTINUED | OUTPATIENT
Start: 2017-11-20 | End: 2017-11-20 | Stop reason: SURG

## 2017-11-20 RX ORDER — FAMOTIDINE 20 MG/1
20 TABLET, FILM COATED ORAL EVERY 12 HOURS SCHEDULED
Status: DISCONTINUED | OUTPATIENT
Start: 2017-11-20 | End: 2017-11-21 | Stop reason: HOSPADM

## 2017-11-20 RX ORDER — MEPERIDINE HYDROCHLORIDE 25 MG/ML
12.5 INJECTION INTRAMUSCULAR; INTRAVENOUS; SUBCUTANEOUS
Status: DISCONTINUED | OUTPATIENT
Start: 2017-11-20 | End: 2017-11-20 | Stop reason: HOSPADM

## 2017-11-20 RX ORDER — OXYCODONE AND ACETAMINOPHEN 10; 325 MG/1; MG/1
2 TABLET ORAL EVERY 4 HOURS PRN
Status: DISCONTINUED | OUTPATIENT
Start: 2017-11-20 | End: 2017-11-21 | Stop reason: HOSPADM

## 2017-11-20 RX ORDER — SODIUM CHLORIDE 9 MG/ML
75 INJECTION, SOLUTION INTRAVENOUS CONTINUOUS
Status: DISCONTINUED | OUTPATIENT
Start: 2017-11-20 | End: 2017-11-20 | Stop reason: SDUPTHER

## 2017-11-20 RX ORDER — MIDAZOLAM HYDROCHLORIDE 1 MG/ML
1 INJECTION INTRAMUSCULAR; INTRAVENOUS
Status: DISCONTINUED | OUTPATIENT
Start: 2017-11-20 | End: 2017-11-20 | Stop reason: HOSPADM

## 2017-11-20 RX ORDER — MORPHINE SULFATE 2 MG/ML
2 INJECTION, SOLUTION INTRAMUSCULAR; INTRAVENOUS AS NEEDED
Status: DISCONTINUED | OUTPATIENT
Start: 2017-11-20 | End: 2017-11-20 | Stop reason: HOSPADM

## 2017-11-20 RX ORDER — FENTANYL CITRATE 50 UG/ML
INJECTION, SOLUTION INTRAMUSCULAR; INTRAVENOUS AS NEEDED
Status: DISCONTINUED | OUTPATIENT
Start: 2017-11-20 | End: 2017-11-20 | Stop reason: SURG

## 2017-11-20 RX ORDER — DEXAMETHASONE SODIUM PHOSPHATE 4 MG/ML
4 INJECTION, SOLUTION INTRA-ARTICULAR; INTRALESIONAL; INTRAMUSCULAR; INTRAVENOUS; SOFT TISSUE ONCE AS NEEDED
Status: COMPLETED | OUTPATIENT
Start: 2017-11-20 | End: 2017-11-20

## 2017-11-20 RX ORDER — SODIUM CHLORIDE 0.9 % (FLUSH) 0.9 %
1-10 SYRINGE (ML) INJECTION AS NEEDED
Status: DISCONTINUED | OUTPATIENT
Start: 2017-11-20 | End: 2017-11-21 | Stop reason: HOSPADM

## 2017-11-20 RX ORDER — ONDANSETRON 4 MG/1
4 TABLET, ORALLY DISINTEGRATING ORAL EVERY 6 HOURS PRN
Status: DISCONTINUED | OUTPATIENT
Start: 2017-11-20 | End: 2017-11-21 | Stop reason: HOSPADM

## 2017-11-20 RX ORDER — ONDANSETRON 4 MG/1
4 TABLET, FILM COATED ORAL EVERY 6 HOURS PRN
Status: DISCONTINUED | OUTPATIENT
Start: 2017-11-20 | End: 2017-11-21 | Stop reason: HOSPADM

## 2017-11-20 RX ORDER — FLUMAZENIL 0.1 MG/ML
0.2 INJECTION INTRAVENOUS AS NEEDED
Status: DISCONTINUED | OUTPATIENT
Start: 2017-11-20 | End: 2017-11-20 | Stop reason: HOSPADM

## 2017-11-20 RX ORDER — NALOXONE HCL 0.4 MG/ML
0.04 VIAL (ML) INJECTION AS NEEDED
Status: DISCONTINUED | OUTPATIENT
Start: 2017-11-20 | End: 2017-11-20 | Stop reason: HOSPADM

## 2017-11-20 RX ORDER — ONDANSETRON 2 MG/ML
4 INJECTION INTRAMUSCULAR; INTRAVENOUS AS NEEDED
Status: DISCONTINUED | OUTPATIENT
Start: 2017-11-20 | End: 2017-11-20 | Stop reason: HOSPADM

## 2017-11-20 RX ORDER — ROCURONIUM BROMIDE 10 MG/ML
INJECTION, SOLUTION INTRAVENOUS AS NEEDED
Status: DISCONTINUED | OUTPATIENT
Start: 2017-11-20 | End: 2017-11-20 | Stop reason: SURG

## 2017-11-20 RX ORDER — PROMETHAZINE HYDROCHLORIDE 25 MG/1
25 TABLET ORAL EVERY 8 HOURS PRN
COMMUNITY

## 2017-11-20 RX ORDER — SCOLOPAMINE TRANSDERMAL SYSTEM 1 MG/1
1 PATCH, EXTENDED RELEASE TRANSDERMAL ONCE
Status: DISCONTINUED | OUTPATIENT
Start: 2017-11-20 | End: 2017-11-21

## 2017-11-20 RX ORDER — PROPOFOL 10 MG/ML
VIAL (ML) INTRAVENOUS AS NEEDED
Status: DISCONTINUED | OUTPATIENT
Start: 2017-11-20 | End: 2017-11-20 | Stop reason: SURG

## 2017-11-20 RX ORDER — SODIUM CHLORIDE, SODIUM LACTATE, POTASSIUM CHLORIDE, CALCIUM CHLORIDE 600; 310; 30; 20 MG/100ML; MG/100ML; MG/100ML; MG/100ML
100 INJECTION, SOLUTION INTRAVENOUS CONTINUOUS
Status: DISCONTINUED | OUTPATIENT
Start: 2017-11-20 | End: 2017-11-20 | Stop reason: SDUPTHER

## 2017-11-20 RX ORDER — OXYCODONE HCL 20 MG/1
20 TABLET, FILM COATED, EXTENDED RELEASE ORAL ONCE
Status: COMPLETED | OUTPATIENT
Start: 2017-11-20 | End: 2017-11-20

## 2017-11-20 RX ORDER — SODIUM CHLORIDE 9 MG/ML
INJECTION, SOLUTION INTRAVENOUS AS NEEDED
Status: DISCONTINUED | OUTPATIENT
Start: 2017-11-20 | End: 2017-11-20 | Stop reason: HOSPADM

## 2017-11-20 RX ORDER — ATENOLOL 100 MG/1
100 TABLET ORAL DAILY
Status: DISCONTINUED | OUTPATIENT
Start: 2017-11-20 | End: 2017-11-21 | Stop reason: HOSPADM

## 2017-11-20 RX ORDER — LIDOCAINE HYDROCHLORIDE 40 MG/ML
SOLUTION TOPICAL AS NEEDED
Status: DISCONTINUED | OUTPATIENT
Start: 2017-11-20 | End: 2017-11-20 | Stop reason: SURG

## 2017-11-20 RX ORDER — SODIUM CHLORIDE 9 MG/ML
75 INJECTION, SOLUTION INTRAVENOUS CONTINUOUS
Status: DISCONTINUED | OUTPATIENT
Start: 2017-11-20 | End: 2017-11-21 | Stop reason: HOSPADM

## 2017-11-20 RX ORDER — MIDAZOLAM HYDROCHLORIDE 1 MG/ML
2 INJECTION INTRAMUSCULAR; INTRAVENOUS
Status: DISCONTINUED | OUTPATIENT
Start: 2017-11-20 | End: 2017-11-20 | Stop reason: HOSPADM

## 2017-11-20 RX ORDER — FAMOTIDINE 10 MG/ML
20 INJECTION, SOLUTION INTRAVENOUS EVERY 12 HOURS SCHEDULED
Status: DISCONTINUED | OUTPATIENT
Start: 2017-11-20 | End: 2017-11-21 | Stop reason: HOSPADM

## 2017-11-20 RX ORDER — SODIUM CHLORIDE 0.9 % (FLUSH) 0.9 %
1-10 SYRINGE (ML) INJECTION AS NEEDED
Status: DISCONTINUED | OUTPATIENT
Start: 2017-11-20 | End: 2017-11-20 | Stop reason: HOSPADM

## 2017-11-20 RX ORDER — METOCLOPRAMIDE HYDROCHLORIDE 5 MG/ML
5 INJECTION INTRAMUSCULAR; INTRAVENOUS
Status: DISCONTINUED | OUTPATIENT
Start: 2017-11-20 | End: 2017-11-20 | Stop reason: HOSPADM

## 2017-11-20 RX ORDER — DIAZEPAM 5 MG/1
5 TABLET ORAL
Status: DISCONTINUED | OUTPATIENT
Start: 2017-11-20 | End: 2017-11-21 | Stop reason: HOSPADM

## 2017-11-20 RX ORDER — LABETALOL HYDROCHLORIDE 5 MG/ML
5 INJECTION, SOLUTION INTRAVENOUS
Status: DISCONTINUED | OUTPATIENT
Start: 2017-11-20 | End: 2017-11-20 | Stop reason: HOSPADM

## 2017-11-20 RX ORDER — DEXAMETHASONE SODIUM PHOSPHATE 4 MG/ML
INJECTION, SOLUTION INTRA-ARTICULAR; INTRALESIONAL; INTRAMUSCULAR; INTRAVENOUS; SOFT TISSUE AS NEEDED
Status: DISCONTINUED | OUTPATIENT
Start: 2017-11-20 | End: 2017-11-20 | Stop reason: SURG

## 2017-11-20 RX ORDER — MAGNESIUM HYDROXIDE 1200 MG/15ML
LIQUID ORAL AS NEEDED
Status: DISCONTINUED | OUTPATIENT
Start: 2017-11-20 | End: 2017-11-20 | Stop reason: HOSPADM

## 2017-11-20 RX ORDER — SODIUM CHLORIDE, SODIUM LACTATE, POTASSIUM CHLORIDE, CALCIUM CHLORIDE 600; 310; 30; 20 MG/100ML; MG/100ML; MG/100ML; MG/100ML
100 INJECTION, SOLUTION INTRAVENOUS CONTINUOUS PRN
Status: DISCONTINUED | OUTPATIENT
Start: 2017-11-20 | End: 2017-11-20 | Stop reason: HOSPADM

## 2017-11-20 RX ORDER — SODIUM CHLORIDE, SODIUM LACTATE, POTASSIUM CHLORIDE, CALCIUM CHLORIDE 600; 310; 30; 20 MG/100ML; MG/100ML; MG/100ML; MG/100ML
20 INJECTION, SOLUTION INTRAVENOUS CONTINUOUS
Status: DISCONTINUED | OUTPATIENT
Start: 2017-11-20 | End: 2017-11-20 | Stop reason: SDUPTHER

## 2017-11-20 RX ORDER — ONDANSETRON 2 MG/ML
INJECTION INTRAMUSCULAR; INTRAVENOUS AS NEEDED
Status: DISCONTINUED | OUTPATIENT
Start: 2017-11-20 | End: 2017-11-20 | Stop reason: SURG

## 2017-11-20 RX ORDER — DIPHENHYDRAMINE HCL 25 MG
25 CAPSULE ORAL NIGHTLY PRN
Status: DISCONTINUED | OUTPATIENT
Start: 2017-11-20 | End: 2017-11-21 | Stop reason: HOSPADM

## 2017-11-20 RX ORDER — IPRATROPIUM BROMIDE AND ALBUTEROL SULFATE 2.5; .5 MG/3ML; MG/3ML
3 SOLUTION RESPIRATORY (INHALATION)
Status: DISCONTINUED | OUTPATIENT
Start: 2017-11-20 | End: 2017-11-21 | Stop reason: HOSPADM

## 2017-11-20 RX ADMIN — SCOPALAMINE 1 PATCH: 1 PATCH, EXTENDED RELEASE TRANSDERMAL at 06:39

## 2017-11-20 RX ADMIN — BENZOCAINE 6 MG-MENTHOL 10 MG LOZENGES 1 LOZENGE: at 21:11

## 2017-11-20 RX ADMIN — SODIUM CHLORIDE, POTASSIUM CHLORIDE, SODIUM LACTATE AND CALCIUM CHLORIDE 100 ML/HR: 600; 310; 30; 20 INJECTION, SOLUTION INTRAVENOUS at 05:49

## 2017-11-20 RX ADMIN — IPRATROPIUM BROMIDE AND ALBUTEROL SULFATE 3 ML: 2.5; .5 SOLUTION RESPIRATORY (INHALATION) at 15:21

## 2017-11-20 RX ADMIN — EPHEDRINE SULFATE 10 MG: 50 INJECTION INTRAMUSCULAR; INTRAVENOUS; SUBCUTANEOUS at 07:28

## 2017-11-20 RX ADMIN — LIDOCAINE HYDROCHLORIDE 50 MG: 20 INJECTION, SOLUTION INFILTRATION; PERINEURAL at 07:06

## 2017-11-20 RX ADMIN — FENTANYL CITRATE 100 MCG: 50 INJECTION INTRAMUSCULAR; INTRAVENOUS at 07:45

## 2017-11-20 RX ADMIN — SUCCINYLCHOLINE CHLORIDE 120 MG: 20 INJECTION, SOLUTION INTRAMUSCULAR; INTRAVENOUS at 07:07

## 2017-11-20 RX ADMIN — ROCURONIUM BROMIDE 10 MG: 10 INJECTION INTRAVENOUS at 07:06

## 2017-11-20 RX ADMIN — PROPOFOL 175 MCG/KG/MIN: 10 INJECTION, EMULSION INTRAVENOUS at 07:05

## 2017-11-20 RX ADMIN — IPRATROPIUM BROMIDE AND ALBUTEROL SULFATE 3 ML: 2.5; .5 SOLUTION RESPIRATORY (INHALATION) at 12:00

## 2017-11-20 RX ADMIN — PROPOFOL: 10 INJECTION, EMULSION INTRAVENOUS at 08:10

## 2017-11-20 RX ADMIN — PROPOFOL: 10 INJECTION, EMULSION INTRAVENOUS at 09:05

## 2017-11-20 RX ADMIN — ONDANSETRON HYDROCHLORIDE 4 MG: 2 SOLUTION INTRAMUSCULAR; INTRAVENOUS at 09:59

## 2017-11-20 RX ADMIN — SODIUM CHLORIDE 75 ML/HR: 9 INJECTION, SOLUTION INTRAVENOUS at 11:51

## 2017-11-20 RX ADMIN — OXYCODONE HYDROCHLORIDE AND ACETAMINOPHEN 2 TABLET: 10; 325 TABLET ORAL at 17:23

## 2017-11-20 RX ADMIN — LIDOCAINE HYDROCHLORIDE 0.5 ML: 10 INJECTION, SOLUTION EPIDURAL; INFILTRATION; INTRACAUDAL; PERINEURAL at 05:49

## 2017-11-20 RX ADMIN — OXYCODONE HYDROCHLORIDE 20 MG: 20 TABLET, FILM COATED, EXTENDED RELEASE ORAL at 05:49

## 2017-11-20 RX ADMIN — DEXAMETHASONE SODIUM PHOSPHATE 4 MG: 4 INJECTION, SOLUTION INTRAMUSCULAR; INTRAVENOUS at 07:38

## 2017-11-20 RX ADMIN — FAMOTIDINE 20 MG: 20 TABLET, FILM COATED ORAL at 13:18

## 2017-11-20 RX ADMIN — CEFAZOLIN SODIUM 1 G: 1 SOLUTION INTRAVENOUS at 22:17

## 2017-11-20 RX ADMIN — FAMOTIDINE 20 MG: 20 TABLET, FILM COATED ORAL at 21:11

## 2017-11-20 RX ADMIN — SODIUM CHLORIDE, POTASSIUM CHLORIDE, SODIUM LACTATE AND CALCIUM CHLORIDE 20 ML/HR: 600; 310; 30; 20 INJECTION, SOLUTION INTRAVENOUS at 06:01

## 2017-11-20 RX ADMIN — DEXAMETHASONE SODIUM PHOSPHATE 4 MG: 4 INJECTION, SOLUTION INTRAMUSCULAR; INTRAVENOUS at 06:41

## 2017-11-20 RX ADMIN — CEFAZOLIN SODIUM 1 G: 1 SOLUTION INTRAVENOUS at 14:56

## 2017-11-20 RX ADMIN — ONDANSETRON 4 MG: 2 INJECTION, SOLUTION INTRAMUSCULAR; INTRAVENOUS at 11:15

## 2017-11-20 RX ADMIN — IPRATROPIUM BROMIDE AND ALBUTEROL SULFATE 3 ML: 2.5; .5 SOLUTION RESPIRATORY (INHALATION) at 20:18

## 2017-11-20 RX ADMIN — LIDOCAINE HYDROCHLORIDE 1 EACH: 40 SOLUTION TOPICAL at 07:08

## 2017-11-20 RX ADMIN — MIDAZOLAM HYDROCHLORIDE 2 MG: 1 INJECTION, SOLUTION INTRAMUSCULAR; INTRAVENOUS at 06:40

## 2017-11-20 RX ADMIN — PROPOFOL 150 MG: 10 INJECTION, EMULSION INTRAVENOUS at 07:07

## 2017-11-20 RX ADMIN — HYDROMORPHONE HYDROCHLORIDE 0.5 MG: 1 INJECTION, SOLUTION INTRAMUSCULAR; INTRAVENOUS; SUBCUTANEOUS at 11:11

## 2017-11-20 RX ADMIN — FENTANYL CITRATE 150 MCG: 50 INJECTION INTRAMUSCULAR; INTRAVENOUS at 07:04

## 2017-11-20 RX ADMIN — DIAZEPAM 5 MG: 5 TABLET ORAL at 17:39

## 2017-11-20 RX ADMIN — CEFAZOLIN SODIUM 2 G: 2 SOLUTION INTRAVENOUS at 07:14

## 2017-11-20 NOTE — PLAN OF CARE
Problem: Patient Care Overview (Adult)  Goal: Plan of Care Review  Outcome: Ongoing (interventions implemented as appropriate)    11/20/17 0657   Coping/Psychosocial Response Interventions   Plan Of Care Reviewed With patient   Patient Care Overview   Progress no change   Outcome Evaluation   Outcome Summary/Follow up Plan no changes in the preop holding phase

## 2017-11-20 NOTE — PLAN OF CARE
Problem: Perioperative Period (Adult)  Goal: Signs and Symptoms of Listed Potential Problems Will be Absent or Manageable (Perioperative Period)  Outcome: Ongoing (interventions implemented as appropriate)    11/20/17 0629   Perioperative Period   Problems Assessed (Perioperative Period) pain;hypoxia/hypoxemia;perioperative injury;hypothermia;situational response   Problems Present (Perioperative Period) situational response;pain

## 2017-11-20 NOTE — OP NOTE
CERVICAL CORPECTOMY, CERVICAL DISCECTOMY ANTERIOR FUSION WITH INSTRUMENTATION  Procedure Note    Garland Luong  11/20/2017    Pre-op Diagnosis:     1.  Increasing chronic neck pain  2.  Severe dysphagia  3.  Severe right shoulder and arm radiculopathy  4.  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  Degenerative disc disease C4 to 7, worse C5-6  6.  Central disc herniation C4-5  7.  Disc herniation left C6-7  8.  Congenital central cervical spinal stenosis  9.  Severe central and bilateral foraminal stenosis C4 to 7    Post-op Diagnosis:    same    Procedure/CPT® Codes:    1.  Corpectomy C5  2.  Partial corpectomy C4  3.  Anterior cervical discectomy with interbody fusion C6-7  4.  Anterior interbody fusion C4-5, C5-6  5.  Anterior spinal instrumentation C4 to 7 (Biomet anterior plate and screws)  6.  Use of PEEK interbody biomechanical device for fusion C4-6, C6-7 (Vertera Cohere PEEK spacer, Lanx corpectomy spacer)  7.  Use of locally obtained autograft bone for fusion C4 to 7  8.  Use of operating microscope for decompression and microdissection  9.  Use of fluoroscopy for confirmation of surgical level, placement of instrumentation and PEEK spacers  10.  Intraoperative neural monitoring    Anesthesia: General    Surgeon: JAMAAL Morocho MD    Assistant: Chi Wolfe PA-C    Estimated Blood Loss: 150 mL    Complications: None    Condition: Stable to PACU.    Indications:    The patient is a 52-year-old who sees Dr. Avelino Roy for medical issues.  He presented to the office with complaints of increasing chronic neck pain as well as pain down the right shoulder and arm.  He also has been experiencing severe dysphagia.  His MRI revealed degenerative disc disease with severe stenosis due to congenital central cervical spinal stenosis along with disc herniations centrally at C4-5 and on the left at C6-7.  It was felt that he had early signs and symptoms of cervical spondylotic myelopathy and was felt that  surgery should be performed given the severe stenosis present.    After failing conservative measures, it was mutually decided that surgery would be the best option, mainly to prevent worsening or permanent neurologic function given the high level of stenosis and myelomalacia.  Risks, benefits, and complications of surgery were discussed with the patient.  He appeared well informed and wished to proceed.  We specifically discussed the risks of infection, blood loss, nerve root injury, CSF leak, spinal cord injury, and the possibility of incomplete resolution of symptoms.  We also discussed the possible risk of a nonunion and the potential need for additional surgery in the event of a pseudoarthrosis or hardware failure.    Operative Procedure:    After obtaining informed consent and verifying the correct operative levels, the patient was brought to the operating room and placed supine on an operating table.  A general anesthetic was provided by the anesthesia service with the assistance of an endotracheal tube.  Once this was properly positioned and secured, a bump was placed under the patient's shoulders placing the neck into a gentle extended position.  Head halter traction was then used with 10 pounds weight to maintain head position.  The shoulders were taped using 3 inch wide cloth tape to assist with radiographic visualization.  Fluoroscopy was used to identify the disc spaces from C4 to 7, and the skin was marked for our planned incision.  The anterior neck region was then prepped and draped in usual sterile fashion.  A surgical timeout was taken to confirm this was the correct patient, we are working at the correct levels, and that preoperative antibiotics were given in a timely fashion.    A transverse incision was then created over the anterior cervical region using a 10 blade scalpel directly centered over the levels of interest.  Dissection was carried sharply to subcutaneous tissues.  The platysma was  divided in line with the incision and blunt dissection was carried along the medial border of the sternocleidomastoid muscle, lateral to the strap musculature the neck.  The carotid pulse was then palpated, and dissection was carried medial to the carotid sheath and lateral to the trachea and esophagus.  Handheld Cloward retractors along with Kitners were used to dissect through pretracheal and prevertebral fascia.  A radiographic marker was placed into one of the disc spaces and fluoroscopy was used to confirm that we are indeed at the correct levels.  The longus coli muscles were then elevated from either side of the spine using Bovie cautery.    An initial discectomy was started at each level from C4-7 by creating an annulotomy with Bovie cautery.  A Castillo elevator was used to remove some of the disc material off of the endplates.  Disc material was initially removed using forward angled curettes and pituitaries.  Some anterior osteophytes were removed using a rongeur.  All retrieved bone was cleaned, morcellized and saved for later packing into the disc spaces to assist with obtaining a fusion.  Oakley pins were then placed to allow gentle distraction across the disc spaces.  The microscope was then positioned for the microdissection and decompression portion of the procedure.  Straight curettes were used to remove the cartilaginous endplates and all remaining disc material.     Given the severe central stenosis as well as the advanced degenerative changes at both C4-5 and C5-6, it was felt necessary to perform a corpectomy of C5 in order to adequately and safely decompress the central spinal canal.  After initially preparing the disc spaces previously, I used a rongeur to remove the central portion of the C5 vertebral body.  All retrieved bone was saved for later packing back into spacers to assist with obtaining a fusion.  The rongeur was used as posterior as safely possible, leaving a thin shelf of posterior  bone.  I used a forward angled curettes to release this as much as possible and then resected it with Kerrisons.  The curettes were used to release the posterior longitudinal ligament and this was resected using Kerrisons as well revealing the central spinal canal.  Kerrisons were used to remove osteophytes from the superior endplate of C6 and to perform a neural foraminotomy of the exiting nerve roots at that area.  Bleeding in the epidural space was controlled using FloSeal.    There was still very severe stenosis at the posterior aspect of the endplate inferiorly of C4.  I tried to decompress this area with Kerrisons and curettes, but it was felt that a partial corpectomy would be necessary to more safely decompress this area.  Using a combination of Kerrisons, the high-speed bur, and the rongeur, I removed approximately 50% of the C4 vertebral body inferiorly.  This allowed much more visualization to safely decompress the central spinal canal.  I then performed a bilateral neural foraminotomy with Kerrisons of the exiting nerve roots at what was the C4-5 disc space.  Again bleeding was controlled using FloSeal.  The wound was copiously irrigated with saline solution.  At the end of the decompression involving the corpectomy of C5 and the partial corpectomy of C4, the central spinal canal and all exiting nerve roots were free of compression.    The calipers were then used to measure the corpectomy defect from the superior endplate of C6 up to the remaining portion of C4.  An appropriately sized corpectomy cage from the Lanx instrumentation set with the larger footprint was then packed as tightly as possible with locally obtained autograft bone.  This PEEK corpectomy cage was then tapped into position spanning the corpectomy defect from the remaining portion of C4 down to C6.  This spacer was placed as a PEEK interbody biomechanical device to assist with fusion.    I then turned my attention to the C6-7 disc  space.  Kerrisons were used to remove remaining anterior osteophytes off of the endplates.  Straight curettes were used to remove the cartilaginous endplates and all remaining disc material.  The high-speed bur was then used to remove posterior osteophytes and to contour the endplates in preparation for fusion.  A forward angled curette was used to free up the posterior margins of the vertebral bodies, releasing the posterior longitudinal ligament.  Posterior osteophytes, posterior disc material, and the PLL were all resected using Kerrisons.  Kerrisons were also used to perform a bilateral neural foraminotomy.  At the end of the discectomy decompression, the central spinal canal and the exiting nerve roots at C6-7 appeared to be free of compression.  Bleeding in the epidural space was controlled using FloSeal.  The wound was then copiously irrigated with saline solution.    Next, trial spacers were tapped into position into the disc space of C6-7.  Once the appropriate size was determined, an actual PEEK spacer matching the same size as the trial was delivered to the sterile field.  The spacer was packed as tightly as possible with locally obtained autograft bone.  The spacer was then malleted into position into the disc space of C6-7 under fluoroscopic guidance.  It was placed as a PEEK interbody biomechanical device to assist with fusion.    After confirming the PEEK spacers were properly positioned with fluoroscopy, the Adams pins were removed.  Remaining anterior osteophytes were contoured off of the vertebral bodies using a combination of the high-speed bur and the Rongeur to allow for the best possible plate fixation.  An anterior plate from the Biomet instrumentation set was then chosen to span C4 to 7.  This was held into position using a series of screws.    Final fluoroscopy imaging confirmed adequate position of the plate and screws as well as the PEEK spacers.  All implants appeared to be properly  positioned with good maintenance of cervical alignment.  A final inspection of the operative field was then undertaken to ensure that we had adequate hemostasis.  Bleeding at this point was controlled with FloSeal and bipolar cautery.  A drain was then placed anterior to the spine exiting through a poke hole inferior of the incision.    Closure was accomplished by reapproximating the platysma with a 3-0 Vicryl.  Immediate subcutaneous tissues were reapproximated with a 4-0 Vicryl in a buried fashion.  Final skin closure was accomplished with Mastisol and Steri-Strips.  The wound was then washed and a sterile Bioclusive dressing was applied.  The patient was then placed into a hard cervical collar, extubated, and sent to the recovery room in good stable condition.    The patient tolerated the procedure well.  There were no complications.  We estimated blood loss to be 150 mL.  Intraoperative neural monitoring was used during the procedure.  We used motor evoked potentials, free run EMG, and SSEPs.  There were no neuro monitoring signal changes during the procedure.    Chi Wolfe PA-C provided critical assistance during the procedure.  His assistance was medically necessary in order to allow the procedure to occur in the most safe and efficient manner.  He assisted with not only agent positioning and wound closure, but more importantly with retraction of delicate neurovascular structures, assistance during the corpectomy and discectomy decompressions, as well as the placement of the PEEK spacers and instrumentation to obtain a fusion.    JAMAAL Morocho MD     Date: 11/20/2017  Time: 10:13 AM

## 2017-11-20 NOTE — ANESTHESIA PROCEDURE NOTES
Airway  Urgency: elective    Date/Time: 11/20/2017 7:08 AM  End Time:11/20/2017 7:08 AM  Airway not difficult    General Information and Staff    Patient location during procedure: OR  CRNA: JULIANN BARNHART    Indications and Patient Condition  Indications for airway management: airway protection    Preoxygenated: yes  Mask difficulty assessment: 0 - not attempted    Final Airway Details  Final airway type: endotracheal airway      Successful airway: ETT  Cuffed: yes   Successful intubation technique: direct laryngoscopy  Endotracheal tube insertion site: oral  Blade: Jones  Blade size: #2  ETT size: 7.5 mm  Cormack-Lehane Classification: grade IIa - partial view of glottis  Placement verified by: capnometry   Cuff volume (mL): 7  Measured from: lips  ETT to lips (cm): 24  Number of attempts at approach: 1

## 2017-11-20 NOTE — PLAN OF CARE
Problem: Patient Care Overview (Adult)  Goal: Plan of Care Review  Outcome: Ongoing (interventions implemented as appropriate)    11/20/17 1200   Coping/Psychosocial Response Interventions   Plan Of Care Reviewed With patient   Patient Care Overview   Progress no change   Outcome Evaluation   Outcome Summary/Follow up Plan Pt to room 333 from OR. Pt drowsy at this time. cervical collar in place. gauze and medipore tape in place to incision site. no drainage noted . Joel drain in place scant aomunt drainage noted. Pt f/c d/c'd at this time d/t pt request. Pt rating pain 5/10. no pain meds given at this time.         Problem: Perioperative Period (Adult)  Goal: Signs and Symptoms of Listed Potential Problems Will be Absent or Manageable (Perioperative Period)  Outcome: Ongoing (interventions implemented as appropriate)    Problem: Fall Risk (Adult)  Goal: Identify Related Risk Factors and Signs and Symptoms  Outcome: Outcome(s) achieved Date Met:  11/20/17  Goal: Absence of Falls  Outcome: Ongoing (interventions implemented as appropriate)

## 2017-11-20 NOTE — ANESTHESIA PREPROCEDURE EVALUATION
Anesthesia Evaluation     Patient summary reviewed   history of anesthetic complications (After back surgery in 90's): PONV  NPO Solid Status: > 8 hours  NPO Liquid Status: > 4 hours     Airway   Mallampati: III  TM distance: >3 FB  Neck ROM: full  Dental - normal exam     Pulmonary - normal exam    breath sounds clear to auscultation  (-) asthma, recent URI, sleep apnea, not a smoker  Cardiovascular - normal exam  Exercise tolerance: good (4-7 METS)    Patient on routine beta blocker and Beta blocker given within 24 hours of surgery  Rhythm: regular  Rate: normal    (+) hypertension well controlled,   (-) pacemaker, past MI, angina, cardiac stents      Neuro/Psych  (+) weakness (Right arm), numbness (Right arm),    (-) seizures, TIA, CVA  GI/Hepatic/Renal/Endo    (+)  GERD,   (-) liver disease, no renal disease, diabetes, hypothyroidism, hyperthyroidism    Musculoskeletal     (+) neck pain,   Abdominal    Substance History      OB/GYN          Other        ROS/Med Hx Other: Reports swallowing difficulties, two endoscopies recently with esophageal dilation.  Nausea and vomiting recently, claims he takes phenergan daily                            Anesthesia Plan    ASA 2     general   total IV anesthesia  intravenous induction   Anesthetic plan and risks discussed with patient.

## 2017-11-20 NOTE — ANESTHESIA POSTPROCEDURE EVALUATION
"Patient: Garland Luong    Procedure Summary     Date Anesthesia Start Anesthesia Stop Room / Location    11/20/17 0700 1028 [facility] OR 05 / [facility]       Procedure Diagnosis Surgeon Provider    CERVICAL CORPECTOMY C-5, PARTIAL CORPECTOMY C-4, ANTERIOR CERVICAL DISCETOMY FUSION C6-7, ANTERIOR FUSION WITH INSTRUMENTATION C4-7 (N/A Spine Cervical); CERVICAL DISCECTOMY ANTERIOR FUSION WITH INSTRUMENTATION (N/A Spine Cervical) (M54.12) MD Armond Lewis CRNA          Anesthesia Type: general  Last vitals  BP   91/58 (11/20/17 1225)   Temp   97.7 °F (36.5 °C) (11/20/17 1225)   Pulse   50 (11/20/17 1225)   Resp   16 (11/20/17 1225)     SpO2   98 % (11/20/17 1225)     Post Anesthesia Care and Evaluation      Comments: Patient discharged from PACU prior to anesthesia evaluation based on Angelique Score.  For details, see RN note.     BP 91/58 (BP Location: Left arm, Patient Position: Lying)  Pulse 50  Temp 97.7 °F (36.5 °C) (Oral)   Resp 16  Ht 73\" (185.4 cm)  Wt 214 lb (97.1 kg)  SpO2 98%  BMI 28.23 kg/m2      "

## 2017-11-20 NOTE — PLAN OF CARE
Problem: Patient Care Overview (Adult)  Goal: Plan of Care Review  Outcome: Ongoing (interventions implemented as appropriate)    11/20/17 1130   Coping/Psychosocial Response Interventions   Plan Of Care Reviewed With patient   Patient Care Overview   Progress improving   Outcome Evaluation   Outcome Summary/Follow up Plan pain controlled with medications, meets criteria for discharge from PACU at this time         Problem: Perioperative Period (Adult)  Goal: Signs and Symptoms of Listed Potential Problems Will be Absent or Manageable (Perioperative Period)  Outcome: Ongoing (interventions implemented as appropriate)

## 2017-11-21 VITALS
OXYGEN SATURATION: 100 % | HEART RATE: 48 BPM | DIASTOLIC BLOOD PRESSURE: 71 MMHG | RESPIRATION RATE: 20 BRPM | BODY MASS INDEX: 28.36 KG/M2 | HEIGHT: 73 IN | WEIGHT: 214 LBS | TEMPERATURE: 98.2 F | SYSTOLIC BLOOD PRESSURE: 115 MMHG

## 2017-11-21 LAB
ANION GAP SERPL CALCULATED.3IONS-SCNC: 15 MMOL/L (ref 4–13)
BASOPHILS # BLD AUTO: 0.01 10*3/MM3 (ref 0–0.2)
BASOPHILS NFR BLD AUTO: 0.1 % (ref 0–2)
BUN BLD-MCNC: 15 MG/DL (ref 5–21)
BUN/CREAT SERPL: 12.7 (ref 7–25)
CALCIUM SPEC-SCNC: 8.8 MG/DL (ref 8.4–10.4)
CHLORIDE SERPL-SCNC: 105 MMOL/L (ref 98–110)
CO2 SERPL-SCNC: 23 MMOL/L (ref 24–31)
CREAT BLD-MCNC: 1.18 MG/DL (ref 0.5–1.4)
DEPRECATED RDW RBC AUTO: 43.3 FL (ref 40–54)
EOSINOPHIL # BLD AUTO: 0.02 10*3/MM3 (ref 0–0.7)
EOSINOPHIL NFR BLD AUTO: 0.1 % (ref 0–4)
ERYTHROCYTE [DISTWIDTH] IN BLOOD BY AUTOMATED COUNT: 13.3 % (ref 12–15)
GFR SERPL CREATININE-BSD FRML MDRD: 65 ML/MIN/1.73
GLUCOSE BLD-MCNC: 118 MG/DL (ref 70–100)
HCT VFR BLD AUTO: 37.7 % (ref 40–52)
HGB BLD-MCNC: 12.8 G/DL (ref 14–18)
IMM GRANULOCYTES # BLD: 0.07 10*3/MM3 (ref 0–0.03)
IMM GRANULOCYTES NFR BLD: 0.4 % (ref 0–5)
LYMPHOCYTES # BLD AUTO: 3.54 10*3/MM3 (ref 0.72–4.86)
LYMPHOCYTES NFR BLD AUTO: 21 % (ref 15–45)
MCH RBC QN AUTO: 30.3 PG (ref 28–32)
MCHC RBC AUTO-ENTMCNC: 34 G/DL (ref 33–36)
MCV RBC AUTO: 89.3 FL (ref 82–95)
MONOCYTES # BLD AUTO: 1.31 10*3/MM3 (ref 0.19–1.3)
MONOCYTES NFR BLD AUTO: 7.8 % (ref 4–12)
NEUTROPHILS # BLD AUTO: 11.93 10*3/MM3 (ref 1.87–8.4)
NEUTROPHILS NFR BLD AUTO: 70.6 % (ref 39–78)
PLATELET # BLD AUTO: 188 10*3/MM3 (ref 130–400)
PMV BLD AUTO: 11.2 FL (ref 6–12)
POTASSIUM BLD-SCNC: 4.3 MMOL/L (ref 3.5–5.3)
RBC # BLD AUTO: 4.22 10*6/MM3 (ref 4.8–5.9)
SODIUM BLD-SCNC: 143 MMOL/L (ref 135–145)
WBC NRBC COR # BLD: 16.88 10*3/MM3 (ref 4.8–10.8)

## 2017-11-21 PROCEDURE — 85025 COMPLETE CBC W/AUTO DIFF WBC: CPT | Performed by: ORTHOPAEDIC SURGERY

## 2017-11-21 PROCEDURE — 97165 OT EVAL LOW COMPLEX 30 MIN: CPT

## 2017-11-21 PROCEDURE — 25010000002 PROMETHAZINE PER 50 MG: Performed by: PHYSICIAN ASSISTANT

## 2017-11-21 PROCEDURE — 25010000003 CEFAZOLIN PER 500 MG: Performed by: ORTHOPAEDIC SURGERY

## 2017-11-21 PROCEDURE — G8978 MOBILITY CURRENT STATUS: HCPCS | Performed by: PHYSICAL THERAPIST

## 2017-11-21 PROCEDURE — G8980 MOBILITY D/C STATUS: HCPCS | Performed by: PHYSICAL THERAPIST

## 2017-11-21 PROCEDURE — 94799 UNLISTED PULMONARY SVC/PX: CPT

## 2017-11-21 PROCEDURE — 80048 BASIC METABOLIC PNL TOTAL CA: CPT | Performed by: ORTHOPAEDIC SURGERY

## 2017-11-21 PROCEDURE — 97161 PT EVAL LOW COMPLEX 20 MIN: CPT | Performed by: PHYSICAL THERAPIST

## 2017-11-21 PROCEDURE — 97166 OT EVAL MOD COMPLEX 45 MIN: CPT

## 2017-11-21 PROCEDURE — G8987 SELF CARE CURRENT STATUS: HCPCS | Performed by: OCCUPATIONAL THERAPIST

## 2017-11-21 PROCEDURE — G8988 SELF CARE GOAL STATUS: HCPCS | Performed by: OCCUPATIONAL THERAPIST

## 2017-11-21 PROCEDURE — G8979 MOBILITY GOAL STATUS: HCPCS | Performed by: PHYSICAL THERAPIST

## 2017-11-21 RX ORDER — PROMETHAZINE HYDROCHLORIDE 25 MG/ML
12.5 INJECTION, SOLUTION INTRAMUSCULAR; INTRAVENOUS ONCE
Status: COMPLETED | OUTPATIENT
Start: 2017-11-21 | End: 2017-11-21

## 2017-11-21 RX ORDER — OXYCODONE AND ACETAMINOPHEN 10; 325 MG/1; MG/1
2 TABLET ORAL EVERY 4 HOURS PRN
Start: 2017-11-21 | End: 2017-11-30

## 2017-11-21 RX ADMIN — PROMETHAZINE HYDROCHLORIDE 12.5 MG: 25 INJECTION INTRAMUSCULAR; INTRAVENOUS at 08:37

## 2017-11-21 RX ADMIN — OXYCODONE HYDROCHLORIDE AND ACETAMINOPHEN 2 TABLET: 10; 325 TABLET ORAL at 06:19

## 2017-11-21 RX ADMIN — FAMOTIDINE 20 MG: 20 TABLET, FILM COATED ORAL at 08:33

## 2017-11-21 RX ADMIN — BENZOCAINE 6 MG-MENTHOL 10 MG LOZENGES 1 LOZENGE: at 08:37

## 2017-11-21 RX ADMIN — IPRATROPIUM BROMIDE AND ALBUTEROL SULFATE 3 ML: 2.5; .5 SOLUTION RESPIRATORY (INHALATION) at 07:33

## 2017-11-21 RX ADMIN — BENZOCAINE 6 MG-MENTHOL 10 MG LOZENGES 1 LOZENGE: at 01:57

## 2017-11-21 RX ADMIN — SODIUM CHLORIDE 75 ML/HR: 9 INJECTION, SOLUTION INTRAVENOUS at 02:06

## 2017-11-21 RX ADMIN — OXYCODONE HYDROCHLORIDE AND ACETAMINOPHEN 2 TABLET: 10; 325 TABLET ORAL at 02:04

## 2017-11-21 RX ADMIN — DIAZEPAM 5 MG: 5 TABLET ORAL at 07:28

## 2017-11-21 RX ADMIN — CEFAZOLIN SODIUM 1 G: 1 SOLUTION INTRAVENOUS at 06:16

## 2017-11-21 NOTE — DISCHARGE SUMMARY
Date of Discharge:  11/21/2017    Admission Diagnosis: M54.12    Discharge Diagnosis:   Cervical stenosis      Hospital Course  Patient is a 52 y.o. male Known to our practice. Admitted for the above cervical fusion.  This has been well tolerated and the patient will be discharged home today in good stable condition with instructions for brace at all times.  No driving until directed.  Patient will follow-up with Dr. Morocho's clinic in two weeks. They will call if problems arise.       Condition on Discharge:  STABLE    Vital Signs  Temp:  [97.3 °F (36.3 °C)-98.2 °F (36.8 °C)] 98.2 °F (36.8 °C)  Heart Rate:  [46-65] 48  Resp:  [12-20] 20  BP: ()/(54-71) 115/71    Physical Exam:   Alert and oriented ×3, no acute distress, grossly neurovascularly intact, vital signs stable, dressing clean dry and intact, moving all extremities without focal deficit    Discharge Disposition  Home or Self Care    Discharge Medications   Garland Luong   Home Medication Instructions BURAK:[id number]    Printed on:11/21/17 0751   Medication Information                      atenolol (TENORMIN) 100 MG tablet  Take 100 mg by mouth Daily.             oxyCODONE-acetaminophen (PERCOCET)  MG per tablet  Take 2 tablets by mouth Every 4 (Four) Hours As Needed for Severe Pain  for up to 9 days.             promethazine (PHENERGAN) 25 MG tablet  Take 25 mg by mouth Every 8 (Eight) Hours As Needed for Nausea or Vomiting.                 Discharge Diet: Resume Home diet, advance as tolerated    Activity at Discharge: Resume home activity advace as tolerated, no lifting, no twisting, no bending, brace as directed, no driving until directed.     Follow-up Appointments  Followup with PCP within one week  Followup [facility] at 2weeks post-op         Chi Wolfe PA-C  11/21/17  7:51 AM

## 2017-11-21 NOTE — PLAN OF CARE
Problem: Patient Care Overview (Adult)  Goal: Plan of Care Review  Outcome: Ongoing (interventions implemented as appropriate)    11/21/17 0322   Coping/Psychosocial Response Interventions   Plan Of Care Reviewed With patient   Patient Care Overview   Progress no change   Outcome Evaluation   Outcome Summary/Follow up Plan Patient rested well through the night. c/o pain, prn pain meds given as ordered. dressing to anterior neck CDI, collar in place. VSS, Safety maintained.         Problem: Fall Risk (Adult)  Goal: Absence of Falls  Outcome: Ongoing (interventions implemented as appropriate)    Problem: Laminectomy/Foraminotomy/Discectomy (Adult)  Goal: Signs and Symptoms of Listed Potential Problems Will be Absent or Manageable (Laminectomy/Foraminotomy/Discectomy)  Outcome: Ongoing (interventions implemented as appropriate)

## 2017-11-21 NOTE — CONSULTS
Advance Care Planning/Advance Directive. Garland lying in bed with eyes closed. His mother and father are at bedside Patient/surrogate/family given opportunity to decline. We reviewed purpose and goals for advance care planning. Advance Care Planning education flyer and Facilitator contact information left with patient/family.     Time spent on preparation, facilitation, and documentation was 10 minutes.

## 2017-11-21 NOTE — THERAPY EVALUATION
"Acute Care - Occupational Therapy Initial Evaluation  [facility]     Patient Name: Garland Luong  : 1965  MRN: [id number]  Today's Date: 2017  Onset of Illness/Injury or Date of Surgery Date: (P) 17  Date of Referral to OT: (P) 17  Referring Physician: (P) Dr. Morocho    Admit Date: 2017       ICD-10-CM ICD-9-CM   1. Impaired mobility and ADLs Z74.09 799.89     Patient Active Problem List   Diagnosis   • Stenosis, cervical spine     Past Medical History:   Diagnosis Date   • Bone spur     CERVICAL SPINE    • Chronic back pain    • Chronic neck pain    • Difficulty swallowing    • Frequent sinus infections    • Hypertension    • Nausea    • PONV (postoperative nausea and vomiting)    • Stomach ulcer      Past Surgical History:   Procedure Laterality Date   • ANTERIOR CERVICAL DISCECTOMY W/ FUSION N/A 2017    Procedure: CERVICAL DISCECTOMY ANTERIOR FUSION WITH INSTRUMENTATION;  Surgeon: CARLOS Morocho MD;  Location: [facility];  Service:    • ARM LACERATION REPAIR Left 1977   • BACK SURGERY      LOWER    • CERVICAL CORPECTOMY N/A 2017    Procedure: CERVICAL CORPECTOMY C-5, PARTIAL CORPECTOMY C-4, ANTERIOR CERVICAL DISCETOMY FUSION C6-7, ANTERIOR FUSION WITH INSTRUMENTATION C4-7;  Surgeon: CARLOS Morocho MD;  Location: [facility];  Service:    • ENDOSCOPY      2017 AND 2017 R/T SWALLOWING DIFFICULTIES           OT ASSESSMENT FLOWSHEET (last 72 hours)      OT Evaluation       17 0801 17 0745 17 1200          Rehab Evaluation    Document Type (P)  evaluation   see MAR  -ROHITJ evaluation  -MS       Subjective Information (P)  agree to therapy;complains of;fatigue;dyspnea  -JJ agree to therapy;complains of;pain   \"wobbly\"  -MS       General Information    Patient Profile Review (P)  yes  -JJ yes  -MS       Onset of Illness/Injury or Date of Surgery Date (P)  17  -JJ 17  -MS       Referring Physician (P)  Dr. Morocho  - Dr. Morocho  " -MS       General Observations (P)  Pt sitting EOB, Apsen collar in place, IV.   -JJ pt fowlers in bed, awake and alert. Aspen collar in place. Pt on 2L of O2 per NC  -MS       Pertinent History Of Current Problem (P)  s/pACDF C6-C7, ACF C4-C7 with corpectomy   -JJ s/p ACDF C6-7, ACF C4-7 with corpectomy C4-5 due to difficulty swallowing, neckc pain with radicular symptoms in R shoulder down R arm and into hand, numbness and tngling in R hand, dropping objects, balance difficulty  -MS       Precautions/Limitations (P)  spinal precautions   neck brace on at all times   -JJ spinal precautions   neck brace on at times  -MS       Prior Level of Function (P)  independent:;all household mobility;community mobility;ADL's;driving  -JJ independent:;all household mobility;community mobility;driving;ADL's  -MS       Equipment Currently Used at Home none  -TR  none  -KM      Plans/Goals Discussed With (P)  patient;family;agreed upon  -JJ patient;agreed upon  -MS       Risks Reviewed (P)  patient:;family:;LOB;nausea/vomiting;dizziness;increased discomfort;change in vital signs  -JJ patient:;LOB;nausea/vomiting;dizziness;increased discomfort  -MS       Benefits Reviewed (P)  patient:;family:;improve function;increase independence;increase strength;increase balance;decrease pain;decrease risk of DVT;increase knowledge  -JJ patient:;improve function;increase independence;increase strength;increase balance;decrease pain;increase knowledge  -MS       Barriers to Rehab (P)  physical barrier  -JJ physical barrier  -MS       Living Environment    Lives With (P)  alone  -JJ  alone  -KM      Living Arrangements (P)  house  -JJ  house  -KM      Home Accessibility (P)  no concerns  -JJ  no concerns  -KM      Stair Railings at Home (P)  none  -JJ  none  -KM      Type of Financial/Environmental Concern (P)  none  -JJ  none  -KM      Transportation Available (P)  car;family or friend will provide  -JJ  car;family or friend will provide  -KM       Clinical Impression    Date of Referral to OT (P)  11/20/17  -JJ        OT Diagnosis (P)  impaired ADL and mobility   -JJ        Impairments Found (describe specific impairments) (P)  arousal, attention, and cognition;ROM  -JJ        Patient/Family Goals Statement (P)  to go home   -JJ        Criteria for Skilled Therapeutic Interventions Met (P)  yes  -JJ        Rehab Potential (P)  good, to achieve stated therapy goals  -JJ        Therapy Frequency (P)  3-5 times/wk  -JJ        Predicted Duration of Therapy Intervention (days/wks) (P)  until d/c from facility   -JJ        Anticipated Equipment Needs At Discharge (P)  shower chair  -JJ        Anticipated Discharge Disposition (P)  home with assist  -JJ        Functional Level Prior    Ambulation   0-->independent  -KM      Transferring   0-->independent  -KM      Toileting   0-->independent  -KM      Bathing   0-->independent  -KM      Dressing   0-->independent  -KM      Eating   0-->independent  -KM      Communication   0-->understands/communicates without difficulty  -KM      Swallowing   2-->difficulty swallowing liquids/foods  -KM      Prior Functional Level Comment   none  -KM      Vital Signs    Pre Systolic BP Rehab (P)  117  -JJ        Pre Treatment Diastolic BP (P)  47  -JJ        Pre Patient Position (P)  Sitting  -JJ        Pain Assessment    Pain Assessment (P)  0-10  -JJ 0-10  -MS       Pain Score (P)  0  -JJ 0  -MS       Vision Assessment/Intervention    Visual Impairment (P)  WNL  -JJ        Vision Comment (P)  able to read name bad   -        Cognitive Assessment/Intervention    Current Cognitive/Communication Assessment (P)  functional  -JJ functional  -MS       Orientation Status (P)  oriented x 4  -JJ oriented x 4  -MS       Follows Commands/Answers Questions (P)  able to follow multi-step instructions;75% of the time  -% of the time  -MS       Personal Safety (P)  decreased awareness, need for assist;decreased awareness, need for  safety  -JJ WNL/WFL  -MS       Personal Safety Interventions (P)  nonskid shoes/slippers when out of bed;supervised activity  -JJ gait belt;muscle strengthening facilitated;nonskid shoes/slippers when out of bed;supervised activity  -MS       ROM (Range of Motion)    General ROM (P)  no range of motion deficits identified  -JJ no range of motion deficits identified  -MS       MMT (Manual Muscle Testing)    General MMT Assessment (P)  upper extremity strength deficits identified  -JJ upper extremity strength deficits identified  -MS       Upper Extremity    Upper Ext Manual Muscle Testing Detail (P)  R UE 5/5, L UE 4/5  -JJ R  5/5, L  4/5  -MS       Bed Mobility, Assessment/Treatment    Bed Mob, Supine to Sit, [place]  independent  -MS       Bed Mob, Sit to Supine, [place] (P)  independent  -JJ        Transfer Assessment/Treatment    Transfers, Sit-Stand [place] (P)  independent  -JJ independent  -MS       Transfers, Stand-Sit [place] (P)  independent  -JJ independent  -MS       Functional Mobility    Functional Mobility- Ind. Level (P)  independent  -JJ        Motor Skills/Interventions    Additional Documentation  Fine Motor Coordination Training (Group);Gross Motor Coordination Training (Group)  -MS       Gross Motor Coordination Training    Gross Motor Skill, Impairments Detail  intact for UE and LEs  -MS       Fine Motor Coordination Training    Opposition  Right:;Left:;intact  -MS       Orthotics Prosthetics    Additional Documentation (P)  Orthosis Location (Group);Orthosis Management/Training (Group)  -JJ Orthosis Location (Group);Orthosis Management/Training (Group)  -MS       Orthosis Location    Orthosis Location/Type (P)  neck/back  -JJ neck/back  -MS       Orthosis, Neck/Back (P)  cervical collar  -JJ cervical collar  -MS       Orthosis Management/Training    Orthosis Fabrication Detail (P)  good fit noted with brace   -JJ pt fit with aspen by OI, brace fits properly  -MS        Orthosis Indications (P)  immobilize, protect/position healing structures;prevent deformity;rest, reduce pain;restrict motion  -JJ immobilize, protect/position healing structures;rest, reduce pain;restrict motion  -MS       Orthosis Skills Training (P)  activity limitations;doffing orthosis;donning orthosis;orthosis cleaning;orthosis maintenance;purpose/goals of orthosis;restrictions/precautions;sleeping with orthosis  -JJ activity limitations;doffing orthosis;donning orthosis;orthosis cleaning;orthosis maintenance;purpose/goals of orthosis;sleeping with orthosis  -MS       Orthosis Skills Training Comment (P)  educated both parents on orthoic management, they expressed no concerns   -JJ        Orthosis Wear Schedule (P)  wear full time;remove for hygiene/bathing  -JJ wear full time  -MS       Orthosis Schedule Comment (P)  Pt unable to verbalize or teachback on any education for orthotic   -JJ        Sensory Assessment/Intervention    Sensory Impairment (P)  --   no numbness or tingling per pt. report   -JJ --   intact  -MS       General Therapy Interventions    Planned Therapy Interventions (P)  adaptive equipment training;ADL retraining;orthotic fitting/training  -JJ        Positioning and Restraints    Pre-Treatment Position (P)  in bed  -JJ        Post Treatment Position (P)  bed  -JJ chair  -MS       In Bed (P)  fowlers;notified nsg;call light within reach;encouraged to call for assist;with family/caregiver;side rails up x2;with brace  -JJ        In Chair  sitting;call light within reach;encouraged to call for assist;with family/caregiver;with brace  -MS         User Key  (r) = Recorded By, (t) = Taken By, (c) = Cosigned By    Initials Name Effective Dates    MS Colleen Mckeon, PT DPT 08/02/16 -     KM Kay Reed, RN 12/23/16 -     LUPILLO Matthew OTR/L 06/22/15 -     YO Recinos, OT Student 09/21/17 -            Occupational Therapy Education     Title: PT OT SLP Therapies (Done)      Topic: Occupational Therapy (Done)     Point: ADL training (Done)    Description: Instruct learner(s) on proper safety adaptation and remediation techniques during self care or transfers.   Instruct in proper use of assistive devices.    Learning Progress Summary    Learner Readiness Method Response Comment Documented by Status   Patient Acceptance SANTIAGO YANES Pt educated on the benefit of OT, plan of care, spinal precuations, orthoic wearing schedule, donning/doffing orthotic, and orthotic mgt  11/21/17 1022 Done   Family Acceptance SANTIAGO YANES Pt educated on the benefit of OT, plan of care, spinal precuations, orthoic wearing schedule, donning/doffing orthotic, and orthotic mgt  11/21/17 1022 Done               Point: Precautions (Done)    Description: Instruct learner(s) on prescribed precautions during self-care and functional transfers.    Learning Progress Summary    Learner Readiness Method Response Comment Documented by Status   Patient Acceptance SANTIAGO YANES Pt educated on the benefit of OT, plan of care, spinal precuations, orthoic wearing schedule, donning/doffing orthotic, and orthotic mgt  11/21/17 1022 Done   Family Acceptance SANTIAGO YANES Pt educated on the benefit of OT, plan of care, spinal precuations, orthoic wearing schedule, donning/doffing orthotic, and orthotic mgt  11/21/17 1022 Done               Point: Body mechanics (Done)    Description: Instruct learner(s) on proper positioning and spine alignment during self-care, functional mobility activities and/or exercises.    Learning Progress Summary    Learner Readiness Method Response Comment Documented by Status   Patient Acceptance SANTIAGO YANES Pt educated on the benefit of OT, plan of care, spinal precuations, orthoic wearing schedule, donning/doffing orthotic, and orthotic mgt  11/21/17 1022 Done   Family Acceptance SANTIAGO YANES VU Pt educated on the benefit of OT, plan of care, spinal precuations, orthoic wearing schedule, donning/doffing orthotic, and orthotic mgt   11/21/17 1022 Done                      User Key     Initials Effective Dates Name Provider Type Discipline     09/21/17 -  Carolyn Recinos, OT Student OT Student OT                  OT Recommendation and Plan  Anticipated Equipment Needs At Discharge: (P) shower chair  Anticipated Discharge Disposition: (P) home with assist  Planned Therapy Interventions: (P) adaptive equipment training, ADL retraining, orthotic fitting/training  Therapy Frequency: (P) 3-5 times/wk  Plan of Care Review  Plan Of Care Reviewed With: (P) patient, family  Progress: (P) improving  Outcome Summary/Follow up Plan: (P) OT eval completed. Pt is independent with all aspects of functional mobility. Pt was confused on wearing schedule and how to don and doff orthotic. Pt was unable to maintaine spinal precuations when the brace was off for mgt education to family. Pt was very drousy and confused. Pt was unable to repeat spinal precuations or demonstrate how to don and doff brace. Pt lives alone and does not want to stay with family memebers upon d/c. Pt would benefit from skilled OT services for orthotic training. Anticipated d/c home with assist.           OT Goals       11/21/17 0801          Strength OT LTG    Strength Goal OT LTG, Date Established (P)  --  -JJ      Strength Goal OT LTG, Time to Achieve (P)  --  -JJ      Strength Goal OT LTG, Measure to Achieve (P)  --  -JJ      Strength Goal OT LTG, Additional Goal (P)  Entered in error   -      Patient Education OT LTG    Patient Education OT LTG, Date Established (P)  11/21/17  -      Patient Education OT LTG, Time to Achieve (P)  by discharge  -      Patient Education OT LTG, Education Type (P)  HEP;precautions per surgeon;brace use/care;positioning;adaptive equipment mgmt;lizeth/doff brace  -      Patient Education OT LTG, Education Understanding (P)  independent;demonstrates adequately;verbalizes understanding  -      Patient Education OT LTG, Additional Goal (P)   With good safety while following spinal precautions and caregiver education.   -JJ      UB Dressing OT LTG    UB Dressing Goal OT LTG, Date Established (P)  11/21/17  -JJ      UB Dressing Goal OT LTG, Time to Achieve (P)  by discharge  -JJ      UB Dressing Goal OT LTG, [place] Level (P)  independent  -JJ      UB Dressing Goal OT LTG, Additional Goal (P)  with good safety while maintaining spinal precautions  -JJ        User Key  (r) = Recorded By, (t) = Taken By, (c) = Cosigned By    Initials Name Provider Type    ROHIT Recinos, OT Student OT Student                Outcome Measures       11/21/17 1000 11/21/17 0745       How much help from another person do you currently need...    Turning from your back to your side while in flat bed without using bedrails?  4  -MS     Moving from lying on back to sitting on the side of a flat bed without bedrails?  4  -MS     Moving to and from a bed to a chair (including a wheelchair)?  4  -MS     Standing up from a chair using your arms (e.g., wheelchair, bedside chair)?  4  -MS     Climbing 3-5 steps with a railing?  4  -MS     To walk in hospital room?  4  -MS     AM-PAC 6 Clicks Score  24  -MS     How much help from another is currently needed...    Putting on and taking off regular lower body clothing? (P)  4  -JJ      Bathing (including washing, rinsing, and drying) (P)  3  -JJ      Toileting (which includes using toilet bed pan or urinal) (P)  4  -JJ      Putting on and taking off regular upper body clothing (P)  3  -JJ      Taking care of personal grooming (such as brushing teeth) (P)  3  -JJ      Eating meals (P)  3  -JJ      Score (P)  20  -TR (r) JJ (t)      Functional Assessment    Outcome Measure Options (P)  AM-PAC 6 Clicks Daily Activity (OT)  -JJ AM-PAC 6 Clicks Basic Mobility (PT)  -MS       User Key  (r) = Recorded By, (t) = Taken By, (c) = Cosigned By    Initials Name Provider Type    MS Colleen Mckeon, PT DPT Physical Therapist    LUPILLO WINSLOW  Vipul OTR/L Occupational Therapist    YO Recinos, OT Student OT Student          Time Calculation:   OT Start Time: (P) 0939  OT Stop Time: (P) 1029  OT Time Calculation (min): (P) 50 min    Therapy Charges for Today     Code Description Service Date Service Provider Modifiers Qty    [id number] HC OT EVAL MOD COMPLEXITY 3 11/21/2017 Carolyn Recinos, OT Student GO 1          OT G-codes  OT Professional Judgement Used?: (P) Yes  OT Functional Scales Options: (P) AM-PAC 6 Clicks Daily Activity (OT)  Score: (P) 20  Functional Limitation: (P) Self care  Self Care Current Status (): (P) At least 20 percent but less than 40 percent impaired, limited or restricted  Self Care Goal Status (): (P) At least 1 percent but less than 20 percent impaired, limited or restricted    Carolyn Recinos, OT Student  11/21/2017

## 2017-11-21 NOTE — PLAN OF CARE
Problem: Patient Care Overview (Adult)  Goal: Plan of Care Review  Outcome: Ongoing (interventions implemented as appropriate)    11/21/17 1040   Coping/Psychosocial Response Interventions   Plan Of Care Reviewed With patient   Patient Care Overview   Progress no change   Outcome Evaluation   Outcome Summary/Follow up Plan A&O X4. No neuro changes. PRN main medication given upon request. Pt needs reinforcement on cervical collar, education provided. Discharged home today.        Goal: Adult Individualization and Mutuality  Outcome: Ongoing (interventions implemented as appropriate)  Goal: Discharge Needs Assessment  Outcome: Ongoing (interventions implemented as appropriate)    Problem: Fall Risk (Adult)  Goal: Absence of Falls  Outcome: Ongoing (interventions implemented as appropriate)    Problem: Laminectomy/Foraminotomy/Discectomy (Adult)  Goal: Signs and Symptoms of Listed Potential Problems Will be Absent or Manageable (Laminectomy/Foraminotomy/Discectomy)  Outcome: Ongoing (interventions implemented as appropriate)

## 2017-11-21 NOTE — PLAN OF CARE
Problem: Patient Care Overview (Adult)  Goal: Plan of Care Review  Outcome: Ongoing (interventions implemented as appropriate)    11/21/17 0893   Coping/Psychosocial Response Interventions   Plan Of Care Reviewed With patient   Patient Care Overview   Progress progress toward functional goals as expected   Outcome Evaluation   Outcome Summary/Follow up Plan PT evaluation completed. The patient independent with all functional mobility. He is aware of how to properly lizeth and doff his brace and the purpose of the brace. He is safe to return home from the PT standpoint.

## 2017-11-21 NOTE — PLAN OF CARE
Problem: Inpatient Occupational Therapy  Goal: Strength Goal LTG- OT  Outcome: Unable to achieve outcome(s) by discharge Date Met:  11/21/17 11/21/17 0801 11/21/17 1346   Strength OT LTG   Strength Goal OT LTG, Additional Goal Entered in error  --    Strength Goal OT LTG, Date Goal Reviewed --  11/21/17   Strength Goal OT LTG, Outcome --  goal not met   Strength Goal OT LTG, Reason Goal Not Met --  discharged from facility       Goal: Patient Education Goal LTG- OT  Outcome: Unable to achieve outcome(s) by discharge Date Met:  11/21/17 11/21/17 0801 11/21/17 1346   Patient Education OT LTG   Patient Education OT LTG, Date Established 11/21/17 --    Patient Education OT LTG, Time to Achieve by discharge --    Patient Education OT LTG, Education Type HEP;precautions per surgeon;brace use/care;positioning;adaptive equipment mgmt;lizeth/doff brace --    Patient Education OT LTG, Education Understanding independent;demonstrates adequately;verbalizes understanding --    Patient Education OT LTG, Additional Goal With good safety while following spinal precautions and caregiver education.  --    Patient Education OT LTG, Date Goal Reviewed --  11/21/17   Patient Education OT LTG Outcome --  goal not met   Patient Education OT LTG, Reason Goal Not Met --  discharged from facility       Goal: UB Dressing Goal LTG- OT  Outcome: Unable to achieve outcome(s) by discharge Date Met:  11/21/17 11/21/17 0801 11/21/17 1346   UB Dressing OT LTG   UB Dressing Goal OT LTG, Date Established 11/21/17 --    UB Dressing Goal OT LTG, Time to Achieve by discharge --    UB Dressing Goal OT LTG, [place] Level independent --    UB Dressing Goal OT LTG, Additional Goal with good safety while maintaining spinal precautions --    UB Dressing Goal OT LTG, Date Goal Reviewed --  11/21/17   UB Dressing Goal OT LTG, Outcome --  goal not met   UB Dressing Goal OT LTG, Reason Goal Not Met --  discharged from facility

## 2017-11-21 NOTE — THERAPY DISCHARGE NOTE
Acute Care - Occupational Therapy Discharge Summary  [facility]     Patient Name: Garland Luong  : 1965  MRN: [id number]    Today's Date: 2017  Onset of Illness/Injury or Date of Surgery Date: 17    Date of Referral to OT: 17  Referring Physician: Dr. Morocho      Admit Date: 2017        OT Recommendation and Plan    Visit Dx:    ICD-10-CM ICD-9-CM   1. Impaired mobility and ADLs Z74.09 799.89               Time Calculation- OT       17 1037          Time Calculation- OT    OT Start Time 0939  -TR (r) JJ (t) TR (c)      OT Stop Time 1029  -TR (r) JJ (t) TR (c)      OT Time Calculation (min) 50 min  -TR (r) JJ (t)      OT Received On 17  -TR (r) JJ (t) TR (c)      OT Goal Re-Cert Due Date 17  -TR (r) JJ (t) TR (c)        User Key  (r) = Recorded By, (t) = Taken By, (c) = Cosigned By    Initials Name Provider Type    TR Vicenta Matthew, OTR/L Occupational Therapist    YO Recinos, OT Student OT Student                  OT Goals       17 1346 17 0801       Strength OT LTG    Strength Goal OT LTG, Date Established  --  -TR (r) JJ (t) TR (c)     Strength Goal OT LTG, Time to Achieve  --  -TR (r) JJ (t) TR (c)     Strength Goal OT LTG, Measure to Achieve  --  -TR (r) JJ (t) TR (c)     Strength Goal OT LTG, Additional Goal  Entered in error   -TR (r) JJ (t) TR (c)     Strength Goal OT LTG, Date Goal Reviewed 17  -AC      Strength Goal OT LTG, Outcome goal not met  -AC      Strength Goal OT LTG, Reason Goal Not Met discharged from facility  -AC      Patient Education OT LTG    Patient Education OT LTG, Date Established  17  -TR (r) JJ (t) TR (c)     Patient Education OT LTG, Time to Achieve  by discharge  -TR (r) JJ (t) TR (c)     Patient Education OT LTG, Education Type  HEP;precautions per surgeon;brace use/care;positioning;adaptive equipment mgmt;lizeth/doff brace  -LUPILLO (vale) YO (chelo) LUPILLO (c)     Patient Education OT LTG, Education  Understanding  independent;demonstrates adequately;verbalizes understanding  -TR (r) JJ (t) TR (c)     Patient Education OT LTG, Additional Goal  With good safety while following spinal precautions and caregiver education.   -TR (r) JJ (t) TR (c)     Patient Education OT LTG, Date Goal Reviewed 11/21/17  -      Patient Education OT LTG Outcome goal not met  -      Patient Education OT LTG, Reason Goal Not Met discharged from facility  -      UB Dressing OT LTG    UB Dressing Goal OT LTG, Date Established  11/21/17  -TR (r) JJ (t) TR (c)     UB Dressing Goal OT LTG, Time to Achieve  by discharge  -TR (r) JJ (t) TR (c)     UB Dressing Goal OT LTG, [place] Level  independent  -TR (r) JJ (t) TR (c)     UB Dressing Goal OT LTG, Additional Goal  with good safety while maintaining spinal precautions  -TR (r) JJ (t) TR (c)     UB Dressing Goal OT LTG, Date Goal Reviewed 11/21/17  -      UB Dressing Goal OT LTG, Outcome goal not met  -      UB Dressing Goal OT LTG, Reason Goal Not Met discharged from facility  -        User Key  (r) = Recorded By, (t) = Taken By, (c) = Cosigned By    Initials Name Provider Type     Zhen Hardy, OTR/L Occupational Therapist    LUPILLO Matthew, OTR/L Occupational Therapist    YO Recinos, OT Student OT Student                Outcome Measures       11/21/17 1000 11/21/17 0745       How much help from another person do you currently need...    Turning from your back to your side while in flat bed without using bedrails?  4  -MS     Moving from lying on back to sitting on the side of a flat bed without bedrails?  4  -MS     Moving to and from a bed to a chair (including a wheelchair)?  4  -MS     Standing up from a chair using your arms (e.g., wheelchair, bedside chair)?  4  -MS     Climbing 3-5 steps with a railing?  4  -MS     To walk in hospital room?  4  -MS     AM-PAC 6 Clicks Score  24  -MS     How much help from another is currently needed...     Putting on and taking off regular lower body clothing? 4  -TR (r) JJ (t) TR (c)      Bathing (including washing, rinsing, and drying) 3  -TR (r) JJ (t) TR (c)      Toileting (which includes using toilet bed pan or urinal) 4  -TR (r) JJ (t) TR (c)      Putting on and taking off regular upper body clothing 3  -TR (r) JJ (t) TR (c)      Taking care of personal grooming (such as brushing teeth) 3  -TR (r) JJ (t) TR (c)      Eating meals 3  -TR (r) JJ (t) TR (c)      Score 20  -TR (r) JJ (t)      Functional Assessment    Outcome Measure Options AM-PAC 6 Clicks Daily Activity (OT)  -TR (r) JJ (t) TR (c) AM-PAC 6 Clicks Basic Mobility (PT)  -MS       User Key  (r) = Recorded By, (t) = Taken By, (c) = Cosigned By    Initials Name Provider Type    MS Colleen Mckeon, PT DPT Physical Therapist    LUPILLO Matthew, OTR/L Occupational Therapist    YO Recinos, OT Student OT Student              OT Discharge Summary  Reason for Discharge: Discharge from facility  Outcomes Achieved: Refer to plan of care for updates on goals achieved  Discharge Destination: Home with assist      Zhen Hardy, OTR/L  11/21/2017

## 2017-11-21 NOTE — THERAPY DISCHARGE NOTE
"Acute Care - Physical Therapy Initial Eval/Discharge  [facility]     Patient Name: Garland Luong  : 1965  MRN: [id number]  Today's Date: 2017   Onset of Illness/Injury or Date of Surgery Date: (P) 17  Date of Referral to PT: 17  Referring Physician: (P) Dr. Morocho      Admit Date: 2017    Visit Dx:  No diagnosis found.  Patient Active Problem List   Diagnosis   • Stenosis, cervical spine     Past Medical History:   Diagnosis Date   • Bone spur     CERVICAL SPINE    • Chronic back pain    • Chronic neck pain    • Difficulty swallowing    • Frequent sinus infections    • Hypertension    • Nausea    • PONV (postoperative nausea and vomiting)    • Stomach ulcer      Past Surgical History:   Procedure Laterality Date   • ANTERIOR CERVICAL DISCECTOMY W/ FUSION N/A 2017    Procedure: CERVICAL DISCECTOMY ANTERIOR FUSION WITH INSTRUMENTATION;  Surgeon: CARLOS Morocho MD;  Location: [facility];  Service:    • ARM LACERATION REPAIR Left 1977   • BACK SURGERY      LOWER    • CERVICAL CORPECTOMY N/A 2017    Procedure: CERVICAL CORPECTOMY C-5, PARTIAL CORPECTOMY C-4, ANTERIOR CERVICAL DISCETOMY FUSION C6-7, ANTERIOR FUSION WITH INSTRUMENTATION C4-7;  Surgeon: CARLOS Morocho MD;  Location: [facility] OR;  Service:    • ENDOSCOPY      2017 AND 2017 R/T SWALLOWING DIFFICULTIES           PT ASSESSMENT (last 72 hours)      PT Evaluation       17 0801 17 0745    Rehab Evaluation    Document Type (P)  evaluation   see MAR  -JJ evaluation  -MS    Subjective Information  agree to therapy;complains of;pain   \"wobbly\"  -MS    General Information    Patient Profile Review (P)  yes  -JJ yes  -MS    Onset of Illness/Injury or Date of Surgery Date (P)  17  -JJ 17  -MS    Referring Physician (P)  Dr. Morocho  -JJ Dr. Morocho  -MS    General Observations  pt fowlers in bed, awake and alert. Aspen collar in place. Pt on 2L of O2 per NC  -MS    Pertinent History Of " Current Problem (P)  s/pACDF C6-C7, ACF C4-C7 with corpectomy   -JJ s/p ACDF C6-7, ACF C4-7 with corpectomy C4-5 due to difficulty swallowing, neckc pain with radicular symptoms in R shoulder down R arm and into hand, numbness and tngling in R hand, dropping objects, balance difficulty  -MS    Precautions/Limitations  spinal precautions   neck brace on at times  -MS    Prior Level of Function  independent:;all household mobility;community mobility;driving;ADL's  -MS    Plans/Goals Discussed With  patient;agreed upon  -MS    Risks Reviewed  patient:;LOB;nausea/vomiting;dizziness;increased discomfort  -MS    Benefits Reviewed  patient:;improve function;increase independence;increase strength;increase balance;decrease pain;increase knowledge  -MS    Barriers to Rehab  physical barrier  -MS    Clinical Impression    Date of Referral to PT  11/20/17  -MS    Patient/Family Goals Statement  return home alone  -MS    Criteria for Skilled Therapeutic Interventions Met  no problems identified which require skilled intervention  -MS    Pain Assessment    Pain Assessment  0-10  -MS    Pain Score  0  -MS    Cognitive Assessment/Intervention    Current Cognitive/Communication Assessment  functional  -MS    Orientation Status  oriented x 4  -MS    Follows Commands/Answers Questions  100% of the time  -MS    Personal Safety  WNL/WFL  -MS    Personal Safety Interventions  gait belt;muscle strengthening facilitated;nonskid shoes/slippers when out of bed;supervised activity  -MS    ROM (Range of Motion)    General ROM  no range of motion deficits identified  -MS    MMT (Manual Muscle Testing)    General MMT Assessment  upper extremity strength deficits identified  -MS    Upper Extremity    Upper Ext Manual Muscle Testing Detail  R  5/5, L  4/5  -MS    Bed Mobility, Assessment/Treatment    Bed Mob, Supine to Sit, [place]  independent  -MS    Transfer Assessment/Treatment    Transfers, Sit-Stand [place]  independent   -MS    Transfers, Stand-Sit [place]  independent  -MS    Gait Assessment/Treatment    Gait, [place] Level  independent  -MS    Gait, Distance (Feet)  500  -MS    Motor Skills/Interventions    Additional Documentation  Fine Motor Coordination Training (Group);Gross Motor Coordination Training (Group)  -MS    Fine Motor Coordination Training    Opposition  Right:;Left:;intact  -MS    Gross Motor Coordination Training    Gross Motor Skill, Impairments Detail  intact for UE and LEs  -MS    Orthotics Prosthetics    Additional Documentation  Orthosis Location (Group);Orthosis Management/Training (Group)  -MS    Orthosis Location    Orthosis Location/Type  neck/back  -MS    Orthosis, Neck/Back  cervical collar  -MS    Orthosis Management/Training    Orthosis Fabrication Detail  pt fit with aspen by OI, brace fits properly  -MS    Orthosis Indications  immobilize, protect/position healing structures;rest, reduce pain;restrict motion  -MS    Orthosis Skills Training  activity limitations;doffing orthosis;donning orthosis;orthosis cleaning;orthosis maintenance;purpose/goals of orthosis;sleeping with orthosis  -MS    Orthosis Wear Schedule  wear full time  -MS    Sensory Assessment/Intervention    Sensory Impairment  --   intact  -MS    Positioning and Restraints    Post Treatment Position  chair  -MS    In Chair  sitting;call light within reach;encouraged to call for assist;with family/caregiver;with brace  -MS      11/20/17 1200       General Information    Equipment Currently Used at Home none  -KM     Living Environment    Lives With alone  -KM     Living Arrangements house  -KM     Home Accessibility no concerns  -KM     Stair Railings at Home none  -KM     Type of Financial/Environmental Concern none  -KM     Transportation Available car;family or friend will provide  -KM       User Key  (r) = Recorded By, (t) = Taken By, (c) = Cosigned By    Initials Name Provider Type    MS Colleen Mckeon, PT DPT Physical  Therapist    ROSA Reed RN Registered Nurse    YO Recinos, OT Student OT Student              PT Recommendation and Plan  Anticipated Discharge Disposition: home  PT Frequency: evaluation only  Plan of Care Review  Plan Of Care Reviewed With: patient  Progress: progress toward functional goals as expected  Outcome Summary/Follow up Plan: PT evaluation completed. The patient independent with all functional mobility. He is aware of how to properly lizeth and doff his brace and the purpose of the brace. He is safe to return home from the PT standpoint.              Outcome Measures       11/21/17 0745          How much help from another person do you currently need...    Turning from your back to your side while in flat bed without using bedrails? 4  -MS      Moving from lying on back to sitting on the side of a flat bed without bedrails? 4  -MS      Moving to and from a bed to a chair (including a wheelchair)? 4  -MS      Standing up from a chair using your arms (e.g., wheelchair, bedside chair)? 4  -MS      Climbing 3-5 steps with a railing? 4  -MS      To walk in hospital room? 4  -MS      AM-PAC 6 Clicks Score 24  -MS      Functional Assessment    Outcome Measure Options AM-PAC 6 Clicks Basic Mobility (PT)  -MS        User Key  (r) = Recorded By, (t) = Taken By, (c) = Cosigned By    Initials Name Provider Type    MS Colleen Mckeon PT DPT Physical Therapist           Time Calculation:         PT Charges       11/21/17 0832          Time Calculation    Start Time 0745  -MS      Stop Time 0820  -MS      Time Calculation (min) 35 min  -MS      PT Received On 11/21/17  -MS        User Key  (r) = Recorded By, (t) = Taken By, (c) = Cosigned By    Initials Name Provider Type    MS Colleen Mckeon PT DPT Physical Therapist          Therapy Charges for Today     Code Description Service Date Service Provider Modifiers Qty    91669918499 HC PT MOBILITY CURRENT 11/21/2017 Colleen Mckeon PT DPT GP, CH 1     [id number]  PT MOBILITY PROJECTED 11/21/2017 Colleen Mckeon, PT DPT GP, CH 1    36764962261 HC PT MOBILITY DISCHARGE 11/21/2017 Colleen Mckeon, PT DPT GP, CH 1    [id number] HC PT EVAL LOW COMPLEXITY 3 11/21/2017 Colleen Mckeon, PT DPT GP 1          PT G-Codes  Outcome Measure Options: AM-PAC 6 Clicks Basic Mobility (PT)  Score: 24  Functional Limitation: Mobility: Walking and moving around  Mobility: Walking and Moving Around Current Status (): 0 percent impaired, limited or restricted  Mobility: Walking and Moving Around Goal Status (): 0 percent impaired, limited or restricted  Mobility: Walking and Moving Around Discharge Status (): 0 percent impaired, limited or restricted    PT Discharge Summary  Anticipated Discharge Disposition: home  Reason for Discharge: Independent  Outcomes Achieved: Refer to plan of care for updates on goals achieved  Discharge Destination: Home    Colleen Mckeon, PT DPT  11/21/2017

## 2017-11-21 NOTE — PLAN OF CARE
Problem: Patient Care Overview (Adult)  Goal: Plan of Care Review  Outcome: Ongoing (interventions implemented as appropriate)    11/21/17 0801   Coping/Psychosocial Response Interventions   Plan Of Care Reviewed With patient;family   Patient Care Overview   Progress improving   Outcome Evaluation   Outcome Summary/Follow up Plan OT eval completed. Pt is independent with all aspects of functional mobility. Pt was confused on wearing schedule and how to don and doff orthotic. Pt was unable to maintain spinal precuations when the brace was off for mgt education to family. Pt was very drowsy and confused. Pt was unable to repeat spinal precuations or demonstrate how to don and doff brace. Pt lives alone and does not want to stay with family members upon d/c. Pt would benefit from skilled OT services for orthotic training. Anticipated d/c home with assist.          Problem: Inpatient Occupational Therapy  Goal: Strength Goal LTG- OT    11/21/17 0801   Strength OT LTG   Strength Goal OT LTG, Additional Goal Entered in error        Goal: Patient Education Goal LTG- OT  Outcome: Ongoing (interventions implemented as appropriate)    11/21/17 0801   Patient Education OT LTG   Patient Education OT LTG, Date Established 11/21/17   Patient Education OT LTG, Time to Achieve by discharge   Patient Education OT LTG, Education Type HEP;precautions per surgeon;brace use/care;positioning;adaptive equipment mgmt;lizeth/doff brace   Patient Education OT LTG, Education Understanding independent;demonstrates adequately;verbalizes understanding   Patient Education OT LTG, Additional Goal With good safety while following spinal precautions and caregiver education.        Goal: UB Dressing Goal LTG- OT  Outcome: Ongoing (interventions implemented as appropriate)    11/21/17 0801   UB Dressing OT LTG   UB Dressing Goal OT LTG, Date Established 11/21/17   UB Dressing Goal OT LTG, Time to Achieve by discharge   UB Dressing Goal OT LTG,  [place] Level independent   UB Dressing Goal OT LTG, Additional Goal with good safety while maintaining spinal precautions

## 2018-02-01 ENCOUNTER — TELEPHONE (OUTPATIENT)
Dept: NEUROSURGERY | Age: 53
End: 2018-02-01

## 2019-04-09 ENCOUNTER — TRANSCRIBE ORDERS (OUTPATIENT)
Dept: ADMINISTRATIVE | Facility: HOSPITAL | Age: 54
End: 2019-04-09

## 2019-04-09 DIAGNOSIS — M54.5 LOW BACK PAIN, UNSPECIFIED BACK PAIN LATERALITY, UNSPECIFIED CHRONICITY, WITH SCIATICA PRESENCE UNSPECIFIED: Primary | ICD-10-CM

## 2019-04-12 ENCOUNTER — HOSPITAL ENCOUNTER (OUTPATIENT)
Dept: MRI IMAGING | Facility: HOSPITAL | Age: 54
Discharge: HOME | End: 2019-04-12
Admitting: ORTHOPAEDIC SURGERY

## 2019-04-12 DIAGNOSIS — M54.5 LOW BACK PAIN, UNSPECIFIED BACK PAIN LATERALITY, UNSPECIFIED CHRONICITY, WITH SCIATICA PRESENCE UNSPECIFIED: ICD-10-CM

## 2019-04-12 LAB — CREAT BLDA-MCNC: 1.2 MG/DL (ref 0.6–1.3)

## 2019-04-12 PROCEDURE — A9577 INJ MULTIHANCE: HCPCS | Performed by: ORTHOPAEDIC SURGERY

## 2019-04-12 PROCEDURE — 72158 MRI LUMBAR SPINE W/O & W/DYE: CPT

## 2019-04-12 PROCEDURE — 82565 ASSAY OF CREATININE: CPT

## 2019-04-12 PROCEDURE — 0 GADOBENATE DIMEGLUMINE 529 MG/ML SOLUTION: Performed by: ORTHOPAEDIC SURGERY

## 2019-04-12 RX ADMIN — GADOBENATE DIMEGLUMINE 20 ML: 529 INJECTION, SOLUTION INTRAVENOUS at 15:29

## 2019-07-08 ENCOUNTER — HOSPITAL ENCOUNTER (OUTPATIENT)
Dept: GENERAL RADIOLOGY | Facility: HOSPITAL | Age: 54
Discharge: HOME | End: 2019-07-08
Admitting: ORTHOPAEDIC SURGERY

## 2019-07-08 ENCOUNTER — APPOINTMENT (OUTPATIENT)
Dept: PREADMISSION TESTING | Facility: HOSPITAL | Age: 54
End: 2019-07-08

## 2019-07-08 VITALS
WEIGHT: 219.36 LBS | BODY MASS INDEX: 28.15 KG/M2 | OXYGEN SATURATION: 99 % | HEIGHT: 74 IN | DIASTOLIC BLOOD PRESSURE: 73 MMHG | SYSTOLIC BLOOD PRESSURE: 118 MMHG

## 2019-07-08 LAB
ALBUMIN SERPL-MCNC: 4.1 G/DL (ref 3.5–5)
ALBUMIN/GLOB SERPL: 1.5 G/DL (ref 1.1–2.5)
ALP SERPL-CCNC: 65 U/L (ref 24–120)
ALT SERPL W P-5'-P-CCNC: 47 U/L (ref 0–54)
ANION GAP SERPL CALCULATED.3IONS-SCNC: 7 MMOL/L (ref 4–13)
APTT PPP: 25.6 SECONDS (ref 24.1–35)
AST SERPL-CCNC: 37 U/L (ref 7–45)
BACTERIA UR QL AUTO: ABNORMAL /HPF
BASOPHILS # BLD AUTO: 0.03 10*3/MM3 (ref 0–0.2)
BASOPHILS NFR BLD AUTO: 0.4 % (ref 0–2)
BILIRUB SERPL-MCNC: 0.4 MG/DL (ref 0.1–1)
BILIRUB UR QL STRIP: NEGATIVE
BUN BLD-MCNC: 13 MG/DL (ref 5–21)
BUN/CREAT SERPL: 11.7 (ref 7–25)
CALCIUM SPEC-SCNC: 9.1 MG/DL (ref 8.4–10.4)
CHLORIDE SERPL-SCNC: 106 MMOL/L (ref 98–110)
CLARITY UR: CLEAR
CO2 SERPL-SCNC: 28 MMOL/L (ref 24–31)
COLOR UR: YELLOW
CREAT BLD-MCNC: 1.11 MG/DL (ref 0.5–1.4)
DEPRECATED RDW RBC AUTO: 39.9 FL (ref 40–54)
EOSINOPHIL # BLD AUTO: 0.3 10*3/MM3 (ref 0–0.7)
EOSINOPHIL NFR BLD AUTO: 3.7 % (ref 0–4)
ERYTHROCYTE [DISTWIDTH] IN BLOOD BY AUTOMATED COUNT: 12.1 % (ref 12–15)
GFR SERPL CREATININE-BSD FRML MDRD: 69 ML/MIN/1.73
GLOBULIN UR ELPH-MCNC: 2.7 GM/DL
GLUCOSE BLD-MCNC: 80 MG/DL (ref 70–100)
GLUCOSE UR STRIP-MCNC: NEGATIVE MG/DL
HCT VFR BLD AUTO: 39.6 % (ref 40–52)
HGB BLD-MCNC: 13.5 G/DL (ref 14–18)
HGB UR QL STRIP.AUTO: NEGATIVE
HYALINE CASTS UR QL AUTO: ABNORMAL /LPF
IMM GRANULOCYTES # BLD AUTO: 0.03 10*3/MM3 (ref 0–0.05)
IMM GRANULOCYTES NFR BLD AUTO: 0.4 % (ref 0–5)
INR PPP: 0.94 (ref 0.91–1.09)
KETONES UR QL STRIP: NEGATIVE
LEUKOCYTE ESTERASE UR QL STRIP.AUTO: ABNORMAL
LYMPHOCYTES # BLD AUTO: 3.9 10*3/MM3 (ref 0.72–4.86)
LYMPHOCYTES NFR BLD AUTO: 47.7 % (ref 15–45)
MCH RBC QN AUTO: 30.8 PG (ref 28–32)
MCHC RBC AUTO-ENTMCNC: 34.1 G/DL (ref 33–36)
MCV RBC AUTO: 90.2 FL (ref 82–95)
MONOCYTES # BLD AUTO: 0.59 10*3/MM3 (ref 0.19–1.3)
MONOCYTES NFR BLD AUTO: 7.2 % (ref 4–12)
NEUTROPHILS # BLD AUTO: 3.33 10*3/MM3 (ref 1.87–8.4)
NEUTROPHILS NFR BLD AUTO: 40.6 % (ref 39–78)
NITRITE UR QL STRIP: NEGATIVE
NRBC BLD AUTO-RTO: 0 /100 WBC (ref 0–0.2)
PH UR STRIP.AUTO: 5.5 [PH] (ref 5–8)
PLATELET # BLD AUTO: 186 10*3/MM3 (ref 130–400)
PMV BLD AUTO: 10.9 FL (ref 6–12)
POTASSIUM BLD-SCNC: 4.2 MMOL/L (ref 3.5–5.3)
PROT SERPL-MCNC: 6.8 G/DL (ref 6.3–8.7)
PROT UR QL STRIP: NEGATIVE
PROTHROMBIN TIME: 12.9 SECONDS (ref 11.9–14.6)
RBC # BLD AUTO: 4.39 10*6/MM3 (ref 4.8–5.9)
RBC # UR: ABNORMAL /HPF
REF LAB TEST METHOD: ABNORMAL
SODIUM BLD-SCNC: 141 MMOL/L (ref 135–145)
SP GR UR STRIP: <=1.005 (ref 1–1.03)
SQUAMOUS #/AREA URNS HPF: ABNORMAL /HPF
UROBILINOGEN UR QL STRIP: ABNORMAL
WBC NRBC COR # BLD: 8.18 10*3/MM3 (ref 4.8–10.8)
WBC UR QL AUTO: ABNORMAL /HPF

## 2019-07-08 PROCEDURE — 93005 ELECTROCARDIOGRAM TRACING: CPT

## 2019-07-08 PROCEDURE — 36415 COLL VENOUS BLD VENIPUNCTURE: CPT

## 2019-07-08 PROCEDURE — 85610 PROTHROMBIN TIME: CPT | Performed by: ORTHOPAEDIC SURGERY

## 2019-07-08 PROCEDURE — 93010 ELECTROCARDIOGRAM REPORT: CPT | Performed by: INTERNAL MEDICINE

## 2019-07-08 PROCEDURE — 85025 COMPLETE CBC W/AUTO DIFF WBC: CPT | Performed by: ORTHOPAEDIC SURGERY

## 2019-07-08 PROCEDURE — 71046 X-RAY EXAM CHEST 2 VIEWS: CPT

## 2019-07-08 PROCEDURE — 80053 COMPREHEN METABOLIC PANEL: CPT | Performed by: ORTHOPAEDIC SURGERY

## 2019-07-08 PROCEDURE — 81001 URINALYSIS AUTO W/SCOPE: CPT | Performed by: ORTHOPAEDIC SURGERY

## 2019-07-08 PROCEDURE — 85730 THROMBOPLASTIN TIME PARTIAL: CPT | Performed by: ORTHOPAEDIC SURGERY

## 2019-07-08 RX ORDER — GABAPENTIN 300 MG/1
300 CAPSULE ORAL 3 TIMES DAILY
COMMUNITY

## 2019-07-08 RX ORDER — HYDROCODONE BITARTRATE AND ACETAMINOPHEN 7.5; 325 MG/1; MG/1
1 TABLET ORAL EVERY 6 HOURS PRN
COMMUNITY
End: 2019-07-25 | Stop reason: HOSPADM

## 2019-07-08 RX ORDER — RANITIDINE 150 MG/1
150 TABLET ORAL DAILY
COMMUNITY

## 2019-07-08 RX ORDER — DIAZEPAM 5 MG/1
5 TABLET ORAL 2 TIMES DAILY PRN
COMMUNITY

## 2019-07-08 RX ORDER — FLUOXETINE HYDROCHLORIDE 20 MG/1
20 CAPSULE ORAL DAILY
COMMUNITY

## 2019-07-08 RX ORDER — ONDANSETRON 4 MG/1
4 TABLET, FILM COATED ORAL EVERY 8 HOURS PRN
COMMUNITY

## 2019-07-08 RX ORDER — SALSALATE 750 MG
750 TABLET ORAL 2 TIMES DAILY
COMMUNITY

## 2019-07-08 NOTE — DISCHARGE INSTRUCTIONS
DAY OF SURGERY INSTRUCTIONS        YOUR SURGEON: ***JD STRENGFARZANA    PROCEDURE: ***LUMBAR FUSION    DATE OF SURGERY: ***July 24,2019    ARRIVAL TIME: AS DIRECTED BY OFFICE    YOU MAY TAKE THE FOLLOWING MEDICATION(S) THE MORNING OF SURGERY WITH A SIP OF WATER: ***ATENOLOL, VALIUM, HYDROCODONE,GABAPENTIN      ALL OTHER HOME MEDICATION CHECK WITH YOUR PHYSICIAN                MANAGING PAIN AFTER SURGERY    We know you are probably wondering what your pain will be like after surgery.  Following surgery it is unrealistic to expect you will not have pain.   Pain is how our bodies let us know that something is wrong or cautions us to be careful.  That said, our goal is to make your pain tolerable.    Methods we may use to treat your pain include (oral or IV medications, PCAs, epidurals, nerve blocks, etc.)   While some procedures require IV pain medications for a short time after surgery, transitioning to pain medications by mouth allows for better management of pain.   Your nurse will encourage you to take oral pain medications whenever possible.  IV medications work almost immediately, but only last a short while.  Taking medications by mouth allows for a more constant level of medication in your blood stream for a longer period of time.      Once your pain is out of control it is harder to get back under control.  It is important you are aware when your next dose of pain medication is due.  If you are admitted, your nurse may write the time of your next dose on the white board in your room to help you remember.      We are interested in your pain and encourage you to inform us about aggravating factors during your visit.   Many times a simple repositioning every few hours can make a big difference.    If your physician says it is okay, do not let your pain prevent you from getting out of bed. Be sure to call your nurse for assistance prior to getting up so you do not fall.      Before surgery, please decide your  tolerable pain goal.  These faces help describe the pain ratings we use on a 0-10 scale.   Be prepared to tell us your goal and whether or not you take pain or anxiety medications at home.          BEFORE YOU COME TO THE HOSPITAL  (Pre-op instructions)  • Do not eat, drink, smoke or chew gum after midnight the night before surgery.  This also includes no mints.  • Morning of surgery take only the medicines you have been instructed with a sip of water unless otherwise instructed  by your physician.  • Do not shave, wear makeup or dark nail polish.  • Remove all jewelry including rings.  • Leave anything you consider valuable at home.  • Leave your suitcase in the car until after your surgery.  • Bring the following with you if applicable:  o Picture ID and insurance, Medicare or Medicaid cards  o Co-pay/deductible required by insurance (cash, check, credit card)  o Copy of advance directive, living will or power-of- documents if not brought to PAT  o CPAP or BIPAP mask and tubing  o Relaxation aids (MP3 player, book, magazine)  • On the day of surgery check in at registration located at the main entrance of the hospital.       Outpatient Surgery Guidelines, Adult  Outpatient procedures are those for which the person having the procedure is allowed to go home the same day as the procedure. Various procedures are done on an outpatient basis. You should follow some general guidelines if you will be having an outpatient procedure.  LET YOUR HEALTH CARE PROVIDER KNOW ABOUT:  · Any allergies you have.  · All medicines you are taking, including vitamins, herbs, eye drops, creams, and over-the-counter medicines.  · Previous problems you or members of your family have had with the use of anesthetics.  · Any blood disorders you have.  · Previous surgeries you have had.  · Medical conditions you have.  RISKS AND COMPLICATIONS  Your health care provider will discuss possible risks and complications with you before  surgery. Common risks and complications include:    · Problems due to the use of anesthetics.  · Blood loss and replacement (does not apply to minor surgical procedures).  · Temporary increase in pain due to surgery.  · Uncorrected pain or problems that the surgery was meant to correct.  · Infection.  · New damage.  BEFORE THE PROCEDURE  · Ask your health care provider about changing or stopping your regular medicines. You may need to stop taking certain medicines in the days or weeks before the procedure.  · Stop smoking at least 2 weeks before surgery. This lowers your risk for complications during and after surgery. Ask your health care provider for help with this if needed.  · Eat your usual meals and a light supper the day before surgery. Continue fluid intake. Do not drink alcohol.  · Do not eat or drink after midnight the night before your surgery.   · Arrange for someone to take you home and to stay with you for 24 hours after the procedure. Medicine given for your procedure may affect your ability to drive or to care for yourself.  · Call your health care provider's office if you develop an illness or problem that may prevent you from safely having your procedure.  AFTER THE PROCEDURE  After surgery, you will be taken to a recovery area, where your progress will be monitored. If there are no complications, you will be allowed to go home when you are awake, stable, and taking fluids well. You may have numbness around the surgical site. Healing will take some time. You will have tenderness at the surgical site and may have some swelling and bruising. You may also have some nausea.  HOME CARE INSTRUCTIONS  · Do not drive for 24 hours, or as directed by your health care provider. Do not drive while taking prescription pain medicines.  · Do not drink alcohol for 24 hours.  · Do not make important decisions or sign legal documents for 24 hours.  · You may resume a normal diet and activities as directed.  · Do not  lift anything heavier than 10 pounds (4.5 kg) or play contact sports until your health care provider says it is okay.  · Change your bandages (dressings) as directed.  · Only take over-the-counter or prescription medicines as directed by your health care provider.  · Follow up with your health care provider as directed.  SEEK MEDICAL CARE IF:  · You have increased bleeding (more than a small spot) from the surgical site.  · You have redness, swelling, or increasing pain in the wound.  · You see pus coming from the wound.  · You have a fever.  · You notice a bad smell coming from the wound or dressing.  · You feel lightheaded or faint.  · You develop a rash.  · You have trouble breathing.  · You develop allergies.  MAKE SURE YOU:  · Understand these instructions.  · Will watch your condition.  · Will get help right away if you are not doing well or get worse.     This information is not intended to replace advice given to you by your health care provider. Make sure you discuss any questions you have with your health care provider.     Document Released: 09/12/2002 Document Revised: 05/03/2016 Document Reviewed: 05/22/2014  [employer] Interactive Patient Education ©2016 [employer]       Fall Prevention in Hospitals, Adult  As a hospital patient, your condition and the treatments you receive can increase your risk for falls. Some additional risk factors for falls in a hospital include:  · Being in an unfamiliar environment.  · Being on bed rest.  · Your surgery.  · Taking certain medicines.  · Your tubing requirements, such as intravenous (IV) therapy or catheters.  It is important that you learn how to decrease fall risks while at the hospital. Below are important tips that can help prevent falls.  SAFETY TIPS FOR PREVENTING FALLS  Talk about your risk of falling.  · Ask your health care provider why you are at risk for falling. Is it your medicine, illness, tubing placement, or something else?  · Make a plan with  your health care provider to keep you safe from falls.  · Ask your health care provider or pharmacist about side effects of your medicines. Some medicines can make you dizzy or affect your coordination.  Ask for help.  · Ask for help before getting out of bed. You may need to press your call button.  · Ask for assistance in getting safely to the toilet.  · Ask for a walker or cane to be put at your bedside. Ask that most of the side rails on your bed be placed up before your health care provider leaves the room.  · Ask family or friends to sit with you.  · Ask for things that are out of your reach, such as your glasses, hearing aids, telephone, bedside table, or call button.  Follow these tips to avoid falling:  · Stay lying or seated, rather than standing, while waiting for help.  · Wear rubber-soled slippers or shoes whenever you walk in the hospital.  · Avoid quick, sudden movements.  ¨ Change positions slowly.  ¨ Sit on the side of your bed before standing.  ¨ Stand up slowly and wait before you start to walk.  · Let your health care provider know if there is a spill on the floor.  · Pay careful attention to the medical equipment, electrical cords, and tubes around you.  · When you need help, use your call button by your bed or in the bathroom. Wait for one of your health care providers to help you.  · If you feel dizzy or unsure of your footing, return to bed and wait for assistance.  · Avoid being distracted by the TV, telephone, or another person in your room.  · Do not lean or support yourself on rolling objects, such as IV poles or bedside tables.     This information is not intended to replace advice given to you by your health care provider. Make sure you discuss any questions you have with your health care provider.     Document Released: 12/15/2001 Document Revised: 01/08/2016 Document Reviewed: 08/25/2013  [employer] Interactive Patient Education ©2016 [employer]       Surgical Site Infections  FAQs  What is a Surgical Site Infection (SSI)?  A surgical site infection is an infection that occurs after surgery in the part of the body where the surgery took place. Most patients who have surgery do not develop an infection. However, infections develop in about 1 to 3 out of every 100 patients who have surgery.  Some of the common symptoms of a surgical site infection are:  · Redness and pain around the area where you had surgery  · Drainage of cloudy fluid from your surgical wound  · Fever  Can SSIs be treated?  Yes. Most surgical site infections can be treated with antibiotics. The antibiotic given to you depends on the bacteria (germs) causing the infection. Sometimes patients with SSIs also need another surgery to treat the infection.  What are some of the things that hospitals are doing to prevent SSIs?  To prevent SSIs, doctors, nurses, and other healthcare providers:  · Clean their hands and arms up to their elbows with an antiseptic agent just before the surgery.  · Clean their hands with soap and water or an alcohol-based hand rub before and after caring for each patient.  · May remove some of your hair immediately before your surgery using electric clippers if the hair is in the same area where the procedure will occur. They should not shave you with a razor.  · Wear special hair covers, masks, gowns, and gloves during surgery to keep the surgery area clean.  · Give you antibiotics before your surgery starts. In most cases, you should get antibiotics within 60 minutes before the surgery starts and the antibiotics should be stopped within 24 hours after surgery.  · Clean the skin at the site of your surgery with a special soap that kills germs.  What can I do to help prevent SSIs?  Before your surgery:  · Tell your doctor about other medical problems you may have. Health problems such as allergies, diabetes, and obesity could affect your surgery and your treatment.  · Quit smoking. Patients who smoke  get more infections. Talk to your doctor about how you can quit before your surgery.  · Do not shave near where you will have surgery. Shaving with a razor can irritate your skin and make it easier to develop an infection.  At the time of your surgery:  · Speak up if someone tries to shave you with a razor before surgery. Ask why you need to be shaved and talk with your surgeon if you have any concerns.  · Ask if you will get antibiotics before surgery.  After your surgery:  · Make sure that your healthcare providers clean their hands before examining you, either with soap and water or an alcohol-based hand rub.  · If you do not see your providers clean their hands, please ask them to do so.  · Family and friends who visit you should not touch the surgical wound or dressings.  · Family and friends should clean their hands with soap and water or an alcohol-based hand rub before and after visiting you. If you do not see them clean their hands, ask them to clean their hands.  What do I need to do when I go home from the hospital?  · Before you go home, your doctor or nurse should explain everything you need to know about taking care of your wound. Make sure you understand how to care for your wound before you leave the hospital.  · Always clean your hands before and after caring for your wound.  · Before you go home, make sure you know who to contact if you have questions or problems after you get home.  · If you have any symptoms of an infection, such as redness and pain at the surgery site, drainage, or fever, call your doctor immediately.  If you have additional questions, please ask your doctor or nurse.  Developed and co-sponsored by The Society for Healthcare Epidemiology of [place] (SHEA); Infectious Diseases Society of [place] (IDSA); American Hospital Association; Association for Professionals in Infection Control and Epidemiology (APIC); Centers for Disease Control and Prevention (CDC); and The Joint  Commission.     This information is not intended to replace advice given to you by your health care provider. Make sure you discuss any questions you have with your health care provider.     Document Released: 12/23/2014 Document Revised: 01/08/2016 Document Reviewed: 03/02/2016  [employer] Interactive Patient Education ©2016 [employer].       [facility]  CHG 4% Patient Instruction Sheet    Preparing the Skin Before Surgery  Preparing or “prepping” skin before surgery can reduce the risk of infection at the surgical site. To make the process easier,[facility] has chosen 4% Chlorhexidine Gluconate (CHG) antiseptic solution.   The steps below outline the prepping process and should be carefully followed.                                                                                                                                                      Prep the skin at the following time(s):                                                      We recommend you shower the night before surgery, and again the morning of surgery with the 4% CHG antiseptic solution using half of the bottle and a cloth each time.  Dress in clean clothes/sleepwear after showering.  See instructions below for application.          Do not apply any lotions or moisturizers.       Do not shave the area to be prepped for at least 2 days prior to surgery.    Clipping the hair may be done immediately prior to your surgery at the hospital    if needed.    Directions:  Thoroughly rinse your body with water.  Apply 4% CHG to a cloth and wash skin gently, paying special attention to the operative site.  Rinse again thoroughly.  Once you have begun using this product do not apply anything else to your skin. If itching or redness persists, rinse affected areas and discontinue use.    When using this product:  • Keep out of eyes, ears, and mouth.  • If solution should contact these areas, rinse out promptly and thoroughly with water.  • For external  use only.  • Do not use in genital area, on your face or head.      PATIENT/FAMILY/RESPONSIBLE PARTY VERBALIZES UNDERSTANDING OF ABOVE EDUCATION.  COPY OF PAIN SCALE GIVEN AND REVIEWED WITH VERBALIZED UNDERSTANDING.

## 2019-07-09 ENCOUNTER — APPOINTMENT (OUTPATIENT)
Dept: PREADMISSION TESTING | Facility: HOSPITAL | Age: 54
End: 2019-07-09

## 2019-07-10 ENCOUNTER — TELEPHONE (OUTPATIENT)
Dept: VASCULAR SURGERY | Facility: CLINIC | Age: 54
End: 2019-07-10

## 2019-07-16 ENCOUNTER — OFFICE VISIT (OUTPATIENT)
Dept: VASCULAR SURGERY | Facility: CLINIC | Age: 54
End: 2019-07-16

## 2019-07-16 VITALS
DIASTOLIC BLOOD PRESSURE: 80 MMHG | WEIGHT: 221 LBS | OXYGEN SATURATION: 99 % | HEART RATE: 46 BPM | BODY MASS INDEX: 28.36 KG/M2 | SYSTOLIC BLOOD PRESSURE: 122 MMHG | HEIGHT: 74 IN

## 2019-07-16 DIAGNOSIS — I10 ESSENTIAL HYPERTENSION: ICD-10-CM

## 2019-07-16 DIAGNOSIS — G89.29 CHRONIC MIDLINE LOW BACK PAIN, WITH SCIATICA PRESENCE UNSPECIFIED: Primary | ICD-10-CM

## 2019-07-16 DIAGNOSIS — M54.5 CHRONIC MIDLINE LOW BACK PAIN, WITH SCIATICA PRESENCE UNSPECIFIED: Primary | ICD-10-CM

## 2019-07-16 PROCEDURE — 99204 OFFICE O/P NEW MOD 45 MIN: CPT | Performed by: SURGERY

## 2019-07-16 NOTE — PROGRESS NOTES
07/16/2019    CARLOS Morocho MD  [address] [facility] [place] [address]       Garland Luong  1965    Chief Complaint   Patient presents with   • Pre-op Exam     ALIF with Dr Morocho on Wednesday, July 24th, 2019. Patient denies any stroke like symptoms.        Dear CARLOS Morocho MD    HPI  I had the pleasure of seeing your patient Garland Luong in the office today.  Thank you kindly for this consultation.  As you recall, Garland Luong is a 54 y.o.  male who you are currently following for chronic back and neck pain.  He is scheduled for anterior lumbar interbody fusion of L5-S1 on 7/24/19 with Dr. Morocho.  He has no history of DVT.  He does have history of hernia repair.       Past Medical History:   Diagnosis Date   • Acid reflux    • Anxiety    • Arthritis    • Bone spur     CERVICAL SPINE    • Chronic back pain    • Chronic neck pain    • Depression    • Difficulty swallowing    • Dizzy    • Frequent sinus infections    • Hypertension    • Nausea    • Panic attacks    • Pneumonia    • PONV (postoperative nausea and vomiting)    • Stomach ulcer    • Vertigo        Past Surgical History:   Procedure Laterality Date   • ANTERIOR CERVICAL DISCECTOMY W/ FUSION N/A 11/20/2017    Procedure: CERVICAL DISCECTOMY ANTERIOR FUSION WITH INSTRUMENTATION;  Surgeon: CARLOS Morocho MD;  Location: [facility] OR;  Service:    • ARM LACERATION REPAIR Left 1977   • BACK SURGERY  1996    LOWER    • CERVICAL CORPECTOMY N/A 11/20/2017    Procedure: CERVICAL CORPECTOMY C-5, PARTIAL CORPECTOMY C-4, ANTERIOR CERVICAL DISCETOMY FUSION C6-7, ANTERIOR FUSION WITH INSTRUMENTATION C4-7;  Surgeon: CARLOS Morocho MD;  Location: [facility] OR;  Service:    • ENDOSCOPY      JUNE 2017 AND JULY 2017 R/T SWALLOWING DIFFICULTIES        Family History   Problem Relation Age of Onset   • Heart disease Mother    • Kidney disease Father        Social History     Socioeconomic History   • Marital status: Single     Spouse name: Not on  file   • Number of children: Not on file   • Years of education: Not on file   • Highest education level: Not on file   Tobacco Use   • Smoking status: Former Smoker     Last attempt to quit:      Years since quittin.5   • Smokeless tobacco: Former User     Types: Chew   Substance and Sexual Activity   • Alcohol use: No   • Drug use: No   • Sexual activity: Defer       Allergies   Allergen Reactions   • Aspirin GI Intolerance     HISTORY OF ULCERS   • Ibuprofen GI Intolerance     HISTORY OF ULCERS    • Nsaids GI Intolerance     HISTORY OF ULCERS         Current Outpatient Medications:   •  atenolol (TENORMIN) 100 MG tablet, Take 100 mg by mouth Daily., Disp: , Rfl:   •  diazePAM (VALIUM) 5 MG tablet, Take 5 mg by mouth 2 (Two) Times a Day As Needed for Anxiety., Disp: , Rfl:   •  FLUoxetine (PROzac) 20 MG capsule, Take 20 mg by mouth Daily., Disp: , Rfl:   •  gabapentin (NEURONTIN) 300 MG capsule, Take 300 mg by mouth 3 (Three) Times a Day., Disp: , Rfl:   •  HYDROcodone-acetaminophen (NORCO) 7.5-325 MG per tablet, Take 1 tablet by mouth Every 6 (Six) Hours As Needed for Moderate Pain ., Disp: , Rfl:   •  ondansetron (ZOFRAN) 4 MG tablet, Take 4 mg by mouth Every 8 (Eight) Hours As Needed for Nausea or Vomiting., Disp: , Rfl:   •  promethazine (PHENERGAN) 25 MG tablet, Take 25 mg by mouth Every 8 (Eight) Hours As Needed for Nausea or Vomiting., Disp: , Rfl:   •  raNITIdine (ZANTAC) 150 MG tablet, Take 150 mg by mouth Daily., Disp: , Rfl:   •  salsalate (DISALCID) 750 MG tablet, Take 750 mg by mouth 2 (Two) Times a Day., Disp: , Rfl:       Review of Systems   Constitutional: Negative.    HENT: Negative.    Eyes: Negative.    Respiratory: Negative.    Cardiovascular: Negative.    Gastrointestinal: Negative.    Endocrine: Negative.    Genitourinary: Negative.    Musculoskeletal: Positive for back pain, gait problem and neck pain.   Skin: Negative.    Allergic/Immunologic: Negative.    Hematological: Negative.   "  Psychiatric/Behavioral: Negative.    All other systems reviewed and are negative.    /80   Pulse (!) 46   Ht 188 cm (74\")   Wt 100 kg (221 lb)   SpO2 99%   BMI 28.37 kg/m²     Physical Exam   Constitutional: He is oriented to person, place, and time. He appears well-developed and well-nourished.   HENT:   Head: Normocephalic and atraumatic.   Eyes: Pupils are equal, round, and reactive to light. No scleral icterus.   Neck: Neck supple. No JVD present. Carotid bruit is not present. No thyromegaly present.   Cardiovascular: Normal rate, regular rhythm and normal heart sounds.   Pulses:       Carotid pulses are 2+ on the right side, and 2+ on the left side.       Femoral pulses are 2+ on the right side, and 2+ on the left side.       Popliteal pulses are 2+ on the right side, and 2+ on the left side.        Dorsalis pedis pulses are 2+ on the right side, and 2+ on the left side.        Posterior tibial pulses are 2+ on the right side, and 2+ on the left side.   Pulmonary/Chest: Effort normal and breath sounds normal.   Abdominal: Soft. Bowel sounds are normal. He exhibits no distension, no abdominal bruit and no mass. There is no hepatosplenomegaly. There is no tenderness. A hernia is present.       Musculoskeletal: Normal range of motion. He exhibits no edema.        Lumbar back: He exhibits pain.   Lymphadenopathy:     He has no cervical adenopathy.   Neurological: He is alert and oriented to person, place, and time. He has normal strength. No cranial nerve deficit or sensory deficit.   Skin: Skin is warm, dry and intact.   Psychiatric: He has a normal mood and affect. His behavior is normal. Judgment and thought content normal.   Nursing note and vitals reviewed.      Patient Active Problem List   Diagnosis   • Stenosis, cervical spine   • Hypertension   • Chronic back pain        Diagnosis Plan   1. Chronic midline low back pain, with sciatica presence unspecified  Case request    Case request   2. " Essential hypertension         Plan: After thoroughly evaluating Garland Luong, I believe the best course of action is to proceed with anterior lumbar interbody fusion fo L5-S1.  Risks of ALIF were discussed and include, but are not limited to, bleeding, infection, nerve damage, vessel damage, retrograde ejaculation, bowel damage, ureteral damage, DVT, MI, stroke, and death.  The patient understands these risks and wishes to proceed with procedure.  The patient is to continue taking their medications as previously discussed.   This was all discussed in full with complete understanding.  Thank you for allowing me to participate in the care of your patient.  Please do not hesitate to call with any questions or concerns.  We will keep you aware of any further encounters with Garland Luong.        Sincerely yours,         DO Paulina Box Jeanie Nicole, APRN

## 2019-07-24 ENCOUNTER — HOSPITAL ENCOUNTER (INPATIENT)
Facility: HOSPITAL | Age: 54
LOS: 1 days | Discharge: HOME | End: 2019-07-25
Attending: ORTHOPAEDIC SURGERY | Admitting: ORTHOPAEDIC SURGERY

## 2019-07-24 ENCOUNTER — APPOINTMENT (OUTPATIENT)
Dept: GENERAL RADIOLOGY | Facility: HOSPITAL | Age: 54
End: 2019-07-24

## 2019-07-24 ENCOUNTER — ANESTHESIA (OUTPATIENT)
Dept: PERIOP | Facility: HOSPITAL | Age: 54
End: 2019-07-24

## 2019-07-24 ENCOUNTER — ANESTHESIA EVENT (OUTPATIENT)
Dept: PERIOP | Facility: HOSPITAL | Age: 54
End: 2019-07-24

## 2019-07-24 DIAGNOSIS — Z78.9 DECREASED ACTIVITIES OF DAILY LIVING (ADL): ICD-10-CM

## 2019-07-24 DIAGNOSIS — R26.9 GAIT ABNORMALITY: ICD-10-CM

## 2019-07-24 PROBLEM — M48.061 LUMBAR STENOSIS: Status: ACTIVE | Noted: 2019-07-24

## 2019-07-24 LAB
ABO GROUP BLD: NORMAL
BLD GP AB SCN SERPL QL: NEGATIVE
RH BLD: NEGATIVE
T&S EXPIRATION DATE: NORMAL

## 2019-07-24 PROCEDURE — 86900 BLOOD TYPING SEROLOGIC ABO: CPT | Performed by: ORTHOPAEDIC SURGERY

## 2019-07-24 PROCEDURE — 25010000003 CEFAZOLIN 1-4 GM/50ML-% SOLUTION: Performed by: ORTHOPAEDIC SURGERY

## 2019-07-24 PROCEDURE — 74018 RADEX ABDOMEN 1 VIEW: CPT

## 2019-07-24 PROCEDURE — 51702 INSERT TEMP BLADDER CATH: CPT

## 2019-07-24 PROCEDURE — 25010000002 ONDANSETRON PER 1 MG: Performed by: NURSE ANESTHETIST, CERTIFIED REGISTERED

## 2019-07-24 PROCEDURE — 72100 X-RAY EXAM L-S SPINE 2/3 VWS: CPT

## 2019-07-24 PROCEDURE — 97162 PT EVAL MOD COMPLEX 30 MIN: CPT

## 2019-07-24 PROCEDURE — C1713 ANCHOR/SCREW BN/BN,TIS/BN: HCPCS | Performed by: ORTHOPAEDIC SURGERY

## 2019-07-24 PROCEDURE — 25010000002 PROPOFOL 10 MG/ML EMULSION: Performed by: NURSE ANESTHETIST, CERTIFIED REGISTERED

## 2019-07-24 PROCEDURE — 76000 FLUOROSCOPY <1 HR PHYS/QHP: CPT

## 2019-07-24 PROCEDURE — 94799 UNLISTED PULMONARY SVC/PX: CPT

## 2019-07-24 PROCEDURE — 25010000002 NEOSTIGMINE PER 0.5 MG: Performed by: NURSE ANESTHETIST, CERTIFIED REGISTERED

## 2019-07-24 PROCEDURE — 3E0U0GB INTRODUCTION OF RECOMBINANT BONE MORPHOGENETIC PROTEIN INTO JOINTS, OPEN APPROACH: ICD-10-PCS | Performed by: ORTHOPAEDIC SURGERY

## 2019-07-24 PROCEDURE — 0SG00A0 FUSION OF LUMBAR VERTEBRAL JOINT WITH INTERBODY FUSION DEVICE, ANTERIOR APPROACH, ANTERIOR COLUMN, OPEN APPROACH: ICD-10-PCS | Performed by: ORTHOPAEDIC SURGERY

## 2019-07-24 PROCEDURE — 25010000002 MIDAZOLAM PER 1 MG: Performed by: ANESTHESIOLOGY

## 2019-07-24 PROCEDURE — 25010000002 DEXAMETHASONE PER 1 MG: Performed by: NURSE ANESTHETIST, CERTIFIED REGISTERED

## 2019-07-24 PROCEDURE — 94760 N-INVAS EAR/PLS OXIMETRY 1: CPT

## 2019-07-24 PROCEDURE — 86850 RBC ANTIBODY SCREEN: CPT | Performed by: ORTHOPAEDIC SURGERY

## 2019-07-24 PROCEDURE — 86901 BLOOD TYPING SEROLOGIC RH(D): CPT | Performed by: ORTHOPAEDIC SURGERY

## 2019-07-24 PROCEDURE — 22558 ARTHRD ANT NTRBD MIN DSC LUM: CPT | Performed by: SURGERY

## 2019-07-24 PROCEDURE — P9612 CATHETERIZE FOR URINE SPEC: HCPCS

## 2019-07-24 PROCEDURE — 25010000002 DEXAMETHASONE PER 1 MG: Performed by: ANESTHESIOLOGY

## 2019-07-24 PROCEDURE — 97165 OT EVAL LOW COMPLEX 30 MIN: CPT | Performed by: OCCUPATIONAL THERAPIST

## 2019-07-24 PROCEDURE — 0ST40ZZ RESECTION OF LUMBOSACRAL DISC, OPEN APPROACH: ICD-10-PCS | Performed by: ORTHOPAEDIC SURGERY

## 2019-07-24 PROCEDURE — 25010000002 FENTANYL CITRATE (PF) 100 MCG/2ML SOLUTION: Performed by: NURSE ANESTHETIST, CERTIFIED REGISTERED

## 2019-07-24 DEVICE — BONE GRAFT KIT 7510400 INFUSE MEDIUM
Type: IMPLANTABLE DEVICE | Status: FUNCTIONAL
Brand: INFUSE® BONE GRAFT

## 2019-07-24 DEVICE — BONE FILLER VOID NANOSS ADV 3D 10CC: Type: IMPLANTABLE DEVICE | Status: FUNCTIONAL

## 2019-07-24 DEVICE — LK ASMBL STANDALONE M3 ALIF: Type: IMPLANTABLE DEVICE | Status: FUNCTIONAL

## 2019-07-24 DEVICE — SCRW STANDALONE M3 ALIF VARI S/TAP 5.5X25MM: Type: IMPLANTABLE DEVICE | Status: FUNCTIONAL

## 2019-07-24 DEVICE — CAGE ALIF STANDALONE M3 15DEG 27X40X16MM: Type: IMPLANTABLE DEVICE | Status: FUNCTIONAL

## 2019-07-24 DEVICE — 16MM SACRAL PLATE
Type: IMPLANTABLE DEVICE | Status: FUNCTIONAL
Brand: ASPIDA

## 2019-07-24 DEVICE — ALIF SCREW, 6.0MM X 30MM
Type: IMPLANTABLE DEVICE | Status: FUNCTIONAL
Brand: ASPIDA

## 2019-07-24 RX ORDER — FLUOXETINE HYDROCHLORIDE 20 MG/1
20 CAPSULE ORAL DAILY
Status: DISCONTINUED | OUTPATIENT
Start: 2019-07-24 | End: 2019-07-25 | Stop reason: HOSPADM

## 2019-07-24 RX ORDER — DEXTROSE MONOHYDRATE 25 G/50ML
12.5 INJECTION, SOLUTION INTRAVENOUS AS NEEDED
Status: DISCONTINUED | OUTPATIENT
Start: 2019-07-24 | End: 2019-07-25 | Stop reason: HOSPADM

## 2019-07-24 RX ORDER — SCOLOPAMINE TRANSDERMAL SYSTEM 1 MG/1
1 PATCH, EXTENDED RELEASE TRANSDERMAL CONTINUOUS
Status: DISPENSED | OUTPATIENT
Start: 2019-07-24 | End: 2019-07-25

## 2019-07-24 RX ORDER — FENTANYL CITRATE 50 UG/ML
INJECTION, SOLUTION INTRAMUSCULAR; INTRAVENOUS AS NEEDED
Status: DISCONTINUED | OUTPATIENT
Start: 2019-07-24 | End: 2019-07-24 | Stop reason: SURG

## 2019-07-24 RX ORDER — FLUMAZENIL 0.1 MG/ML
0.2 INJECTION INTRAVENOUS AS NEEDED
Status: DISCONTINUED | OUTPATIENT
Start: 2019-07-24 | End: 2019-07-24 | Stop reason: HOSPADM

## 2019-07-24 RX ORDER — MORPHINE SULFATE 2 MG/ML
2 INJECTION, SOLUTION INTRAMUSCULAR; INTRAVENOUS
Status: DISCONTINUED | OUTPATIENT
Start: 2019-07-24 | End: 2019-07-24 | Stop reason: HOSPADM

## 2019-07-24 RX ORDER — OXYCODONE HCL 20 MG/1
20 TABLET, FILM COATED, EXTENDED RELEASE ORAL ONCE
Status: COMPLETED | OUTPATIENT
Start: 2019-07-24 | End: 2019-07-24

## 2019-07-24 RX ORDER — ONDANSETRON 2 MG/ML
4 INJECTION INTRAMUSCULAR; INTRAVENOUS EVERY 6 HOURS PRN
Status: DISCONTINUED | OUTPATIENT
Start: 2019-07-24 | End: 2019-07-25 | Stop reason: HOSPADM

## 2019-07-24 RX ORDER — FAMOTIDINE 20 MG/1
20 TABLET, FILM COATED ORAL EVERY 12 HOURS SCHEDULED
Status: DISCONTINUED | OUTPATIENT
Start: 2019-07-24 | End: 2019-07-25 | Stop reason: HOSPADM

## 2019-07-24 RX ORDER — FAMOTIDINE 10 MG/ML
20 INJECTION, SOLUTION INTRAVENOUS EVERY 12 HOURS SCHEDULED
Status: DISCONTINUED | OUTPATIENT
Start: 2019-07-24 | End: 2019-07-25 | Stop reason: HOSPADM

## 2019-07-24 RX ORDER — FAMOTIDINE 10 MG/ML
20 INJECTION, SOLUTION INTRAVENOUS
Status: DISCONTINUED | OUTPATIENT
Start: 2019-07-24 | End: 2019-07-25 | Stop reason: HOSPADM

## 2019-07-24 RX ORDER — METOCLOPRAMIDE HYDROCHLORIDE 5 MG/ML
5 INJECTION INTRAMUSCULAR; INTRAVENOUS
Status: DISCONTINUED | OUTPATIENT
Start: 2019-07-24 | End: 2019-07-24 | Stop reason: HOSPADM

## 2019-07-24 RX ORDER — MIDAZOLAM HYDROCHLORIDE 1 MG/ML
2 INJECTION INTRAMUSCULAR; INTRAVENOUS
Status: DISCONTINUED | OUTPATIENT
Start: 2019-07-24 | End: 2019-07-24

## 2019-07-24 RX ORDER — DIAZEPAM 5 MG/1
5 TABLET ORAL 2 TIMES DAILY PRN
Status: DISCONTINUED | OUTPATIENT
Start: 2019-07-24 | End: 2019-07-25 | Stop reason: HOSPADM

## 2019-07-24 RX ORDER — FENTANYL CITRATE 50 UG/ML
25 INJECTION, SOLUTION INTRAMUSCULAR; INTRAVENOUS AS NEEDED
Status: DISCONTINUED | OUTPATIENT
Start: 2019-07-24 | End: 2019-07-24 | Stop reason: HOSPADM

## 2019-07-24 RX ORDER — ATENOLOL 100 MG/1
100 TABLET ORAL DAILY
Status: DISCONTINUED | OUTPATIENT
Start: 2019-07-25 | End: 2019-07-25 | Stop reason: HOSPADM

## 2019-07-24 RX ORDER — BUPIVACAINE HCL/0.9 % NACL/PF 0.1 %
2 PLASTIC BAG, INJECTION (ML) EPIDURAL ONCE
Status: COMPLETED | OUTPATIENT
Start: 2019-07-24 | End: 2019-07-24

## 2019-07-24 RX ORDER — MIDAZOLAM HYDROCHLORIDE 1 MG/ML
1 INJECTION INTRAMUSCULAR; INTRAVENOUS
Status: DISCONTINUED | OUTPATIENT
Start: 2019-07-24 | End: 2019-07-24

## 2019-07-24 RX ORDER — LIDOCAINE HYDROCHLORIDE 20 MG/ML
INJECTION, SOLUTION INFILTRATION; PERINEURAL AS NEEDED
Status: DISCONTINUED | OUTPATIENT
Start: 2019-07-24 | End: 2019-07-24 | Stop reason: SURG

## 2019-07-24 RX ORDER — HYDRALAZINE HYDROCHLORIDE 20 MG/ML
5 INJECTION INTRAMUSCULAR; INTRAVENOUS
Status: DISCONTINUED | OUTPATIENT
Start: 2019-07-24 | End: 2019-07-24 | Stop reason: HOSPADM

## 2019-07-24 RX ORDER — ONDANSETRON 2 MG/ML
4 INJECTION INTRAMUSCULAR; INTRAVENOUS AS NEEDED
Status: DISCONTINUED | OUTPATIENT
Start: 2019-07-24 | End: 2019-07-24 | Stop reason: HOSPADM

## 2019-07-24 RX ORDER — OXYCODONE AND ACETAMINOPHEN 10; 325 MG/1; MG/1
1 TABLET ORAL EVERY 4 HOURS PRN
Status: DISCONTINUED | OUTPATIENT
Start: 2019-07-24 | End: 2019-07-25 | Stop reason: HOSPADM

## 2019-07-24 RX ORDER — DIPHENHYDRAMINE HCL 25 MG
25 CAPSULE ORAL NIGHTLY PRN
Status: DISCONTINUED | OUTPATIENT
Start: 2019-07-24 | End: 2019-07-25 | Stop reason: HOSPADM

## 2019-07-24 RX ORDER — GLYCOPYRROLATE 0.2 MG/ML
INJECTION INTRAMUSCULAR; INTRAVENOUS AS NEEDED
Status: DISCONTINUED | OUTPATIENT
Start: 2019-07-24 | End: 2019-07-24 | Stop reason: SURG

## 2019-07-24 RX ORDER — SODIUM CHLORIDE 0.9 % (FLUSH) 0.9 %
3 SYRINGE (ML) INJECTION EVERY 12 HOURS SCHEDULED
Status: DISCONTINUED | OUTPATIENT
Start: 2019-07-24 | End: 2019-07-25 | Stop reason: HOSPADM

## 2019-07-24 RX ORDER — OXYCODONE AND ACETAMINOPHEN 10; 325 MG/1; MG/1
1 TABLET ORAL ONCE AS NEEDED
Status: DISCONTINUED | OUTPATIENT
Start: 2019-07-24 | End: 2019-07-24 | Stop reason: HOSPADM

## 2019-07-24 RX ORDER — LABETALOL HYDROCHLORIDE 5 MG/ML
5 INJECTION, SOLUTION INTRAVENOUS
Status: DISCONTINUED | OUTPATIENT
Start: 2019-07-24 | End: 2019-07-24 | Stop reason: HOSPADM

## 2019-07-24 RX ORDER — ONDANSETRON 2 MG/ML
4 INJECTION INTRAMUSCULAR; INTRAVENOUS EVERY 6 HOURS PRN
Status: DISCONTINUED | OUTPATIENT
Start: 2019-07-24 | End: 2019-07-24 | Stop reason: SDUPTHER

## 2019-07-24 RX ORDER — ONDANSETRON 4 MG/1
4 TABLET, FILM COATED ORAL EVERY 8 HOURS PRN
Status: DISCONTINUED | OUTPATIENT
Start: 2019-07-24 | End: 2019-07-24 | Stop reason: SDUPTHER

## 2019-07-24 RX ORDER — ROCURONIUM BROMIDE 10 MG/ML
INJECTION, SOLUTION INTRAVENOUS AS NEEDED
Status: DISCONTINUED | OUTPATIENT
Start: 2019-07-24 | End: 2019-07-24 | Stop reason: SURG

## 2019-07-24 RX ORDER — SODIUM CHLORIDE 0.9 % (FLUSH) 0.9 %
1-10 SYRINGE (ML) INJECTION AS NEEDED
Status: DISCONTINUED | OUTPATIENT
Start: 2019-07-24 | End: 2019-07-24 | Stop reason: HOSPADM

## 2019-07-24 RX ORDER — SODIUM CHLORIDE 9 MG/ML
75 INJECTION, SOLUTION INTRAVENOUS CONTINUOUS
Status: DISCONTINUED | OUTPATIENT
Start: 2019-07-24 | End: 2019-07-25 | Stop reason: HOSPADM

## 2019-07-24 RX ORDER — SODIUM CHLORIDE 0.9 % (FLUSH) 0.9 %
3-10 SYRINGE (ML) INJECTION AS NEEDED
Status: DISCONTINUED | OUTPATIENT
Start: 2019-07-24 | End: 2019-07-25 | Stop reason: HOSPADM

## 2019-07-24 RX ORDER — EPHEDRINE SULFATE 50 MG/ML
INJECTION INTRAVENOUS AS NEEDED
Status: DISCONTINUED | OUTPATIENT
Start: 2019-07-24 | End: 2019-07-24 | Stop reason: SURG

## 2019-07-24 RX ORDER — GABAPENTIN 300 MG/1
300 CAPSULE ORAL 3 TIMES DAILY
Status: DISCONTINUED | OUTPATIENT
Start: 2019-07-24 | End: 2019-07-25 | Stop reason: HOSPADM

## 2019-07-24 RX ORDER — VASOPRESSIN 20 U/ML
INJECTION PARENTERAL AS NEEDED
Status: DISCONTINUED | OUTPATIENT
Start: 2019-07-24 | End: 2019-07-24 | Stop reason: SURG

## 2019-07-24 RX ORDER — BISACODYL 5 MG/1
5 TABLET, DELAYED RELEASE ORAL DAILY PRN
Status: DISCONTINUED | OUTPATIENT
Start: 2019-07-24 | End: 2019-07-25 | Stop reason: HOSPADM

## 2019-07-24 RX ORDER — DEXAMETHASONE SODIUM PHOSPHATE 4 MG/ML
4 INJECTION, SOLUTION INTRA-ARTICULAR; INTRALESIONAL; INTRAMUSCULAR; INTRAVENOUS; SOFT TISSUE ONCE AS NEEDED
Status: COMPLETED | OUTPATIENT
Start: 2019-07-24 | End: 2019-07-24

## 2019-07-24 RX ORDER — ONDANSETRON 4 MG/1
4 TABLET, FILM COATED ORAL EVERY 6 HOURS PRN
Status: DISCONTINUED | OUTPATIENT
Start: 2019-07-24 | End: 2019-07-25 | Stop reason: HOSPADM

## 2019-07-24 RX ORDER — CEFAZOLIN SODIUM 1 G/50ML
1 INJECTION, SOLUTION INTRAVENOUS EVERY 8 HOURS SCHEDULED
Status: COMPLETED | OUTPATIENT
Start: 2019-07-24 | End: 2019-07-25

## 2019-07-24 RX ORDER — IPRATROPIUM BROMIDE AND ALBUTEROL SULFATE 2.5; .5 MG/3ML; MG/3ML
3 SOLUTION RESPIRATORY (INHALATION) ONCE AS NEEDED
Status: DISCONTINUED | OUTPATIENT
Start: 2019-07-24 | End: 2019-07-24 | Stop reason: HOSPADM

## 2019-07-24 RX ORDER — NALOXONE HCL 0.4 MG/ML
0.04 VIAL (ML) INJECTION AS NEEDED
Status: DISCONTINUED | OUTPATIENT
Start: 2019-07-24 | End: 2019-07-24 | Stop reason: HOSPADM

## 2019-07-24 RX ORDER — SENNA AND DOCUSATE SODIUM 50; 8.6 MG/1; MG/1
1 TABLET, FILM COATED ORAL NIGHTLY PRN
Status: DISCONTINUED | OUTPATIENT
Start: 2019-07-24 | End: 2019-07-25 | Stop reason: HOSPADM

## 2019-07-24 RX ORDER — FENTANYL CITRATE 50 UG/ML
25 INJECTION, SOLUTION INTRAMUSCULAR; INTRAVENOUS
Status: DISCONTINUED | OUTPATIENT
Start: 2019-07-24 | End: 2019-07-24

## 2019-07-24 RX ORDER — PROMETHAZINE HYDROCHLORIDE 25 MG/1
25 TABLET ORAL EVERY 8 HOURS PRN
Status: DISCONTINUED | OUTPATIENT
Start: 2019-07-24 | End: 2019-07-25 | Stop reason: HOSPADM

## 2019-07-24 RX ORDER — ONDANSETRON 2 MG/ML
INJECTION INTRAMUSCULAR; INTRAVENOUS AS NEEDED
Status: DISCONTINUED | OUTPATIENT
Start: 2019-07-24 | End: 2019-07-24 | Stop reason: SURG

## 2019-07-24 RX ORDER — SODIUM CHLORIDE 0.9 % (FLUSH) 0.9 %
3-10 SYRINGE (ML) INJECTION AS NEEDED
Status: DISCONTINUED | OUTPATIENT
Start: 2019-07-24 | End: 2019-07-24 | Stop reason: HOSPADM

## 2019-07-24 RX ORDER — FAMOTIDINE 20 MG/1
20 TABLET, FILM COATED ORAL DAILY
Status: DISCONTINUED | OUTPATIENT
Start: 2019-07-24 | End: 2019-07-24 | Stop reason: SDUPTHER

## 2019-07-24 RX ORDER — PROPOFOL 10 MG/ML
VIAL (ML) INTRAVENOUS AS NEEDED
Status: DISCONTINUED | OUTPATIENT
Start: 2019-07-24 | End: 2019-07-24 | Stop reason: SURG

## 2019-07-24 RX ORDER — SODIUM CHLORIDE, SODIUM LACTATE, POTASSIUM CHLORIDE, CALCIUM CHLORIDE 600; 310; 30; 20 MG/100ML; MG/100ML; MG/100ML; MG/100ML
20 INJECTION, SOLUTION INTRAVENOUS CONTINUOUS
Status: DISCONTINUED | OUTPATIENT
Start: 2019-07-24 | End: 2019-07-24 | Stop reason: ALTCHOICE

## 2019-07-24 RX ORDER — MAGNESIUM HYDROXIDE 1200 MG/15ML
LIQUID ORAL AS NEEDED
Status: DISCONTINUED | OUTPATIENT
Start: 2019-07-24 | End: 2019-07-24 | Stop reason: HOSPADM

## 2019-07-24 RX ORDER — SODIUM CHLORIDE, SODIUM LACTATE, POTASSIUM CHLORIDE, CALCIUM CHLORIDE 600; 310; 30; 20 MG/100ML; MG/100ML; MG/100ML; MG/100ML
9 INJECTION, SOLUTION INTRAVENOUS CONTINUOUS
Status: DISCONTINUED | OUTPATIENT
Start: 2019-07-24 | End: 2019-07-24 | Stop reason: ALTCHOICE

## 2019-07-24 RX ORDER — DEXAMETHASONE SODIUM PHOSPHATE 4 MG/ML
INJECTION, SOLUTION INTRA-ARTICULAR; INTRALESIONAL; INTRAMUSCULAR; INTRAVENOUS; SOFT TISSUE AS NEEDED
Status: DISCONTINUED | OUTPATIENT
Start: 2019-07-24 | End: 2019-07-24 | Stop reason: SURG

## 2019-07-24 RX ORDER — SODIUM CHLORIDE, SODIUM LACTATE, POTASSIUM CHLORIDE, CALCIUM CHLORIDE 600; 310; 30; 20 MG/100ML; MG/100ML; MG/100ML; MG/100ML
100 INJECTION, SOLUTION INTRAVENOUS CONTINUOUS PRN
Status: DISCONTINUED | OUTPATIENT
Start: 2019-07-24 | End: 2019-07-24 | Stop reason: ALTCHOICE

## 2019-07-24 RX ADMIN — GABAPENTIN 300 MG: 300 CAPSULE ORAL at 21:49

## 2019-07-24 RX ADMIN — OXYCODONE HYDROCHLORIDE AND ACETAMINOPHEN 1 TABLET: 10; 325 TABLET ORAL at 10:55

## 2019-07-24 RX ADMIN — CEFAZOLIN SODIUM 1 G: 1 INJECTION, SOLUTION INTRAVENOUS at 13:29

## 2019-07-24 RX ADMIN — DEXAMETHASONE SODIUM PHOSPHATE 4 MG: 4 INJECTION, SOLUTION INTRAMUSCULAR; INTRAVENOUS at 07:21

## 2019-07-24 RX ADMIN — VASOPRESSIN 1 UNITS: 20 INJECTION INTRAVENOUS at 09:00

## 2019-07-24 RX ADMIN — PROPOFOL 160 MG: 10 INJECTION, EMULSION INTRAVENOUS at 07:21

## 2019-07-24 RX ADMIN — SODIUM CHLORIDE 75 ML/HR: 9 INJECTION, SOLUTION INTRAVENOUS at 10:56

## 2019-07-24 RX ADMIN — VASOPRESSIN 1 UNITS: 20 INJECTION INTRAVENOUS at 09:10

## 2019-07-24 RX ADMIN — EPHEDRINE SULFATE 15 MG: 50 INJECTION INTRAVENOUS at 07:23

## 2019-07-24 RX ADMIN — ROCURONIUM BROMIDE 50 MG: 10 INJECTION INTRAVENOUS at 07:21

## 2019-07-24 RX ADMIN — Medication 2 G: at 07:26

## 2019-07-24 RX ADMIN — CEFAZOLIN SODIUM 1 G: 1 INJECTION, SOLUTION INTRAVENOUS at 21:48

## 2019-07-24 RX ADMIN — GABAPENTIN 300 MG: 300 CAPSULE ORAL at 16:38

## 2019-07-24 RX ADMIN — FLUOXETINE HYDROCHLORIDE 20 MG: 20 CAPSULE ORAL at 10:55

## 2019-07-24 RX ADMIN — GLYCOPYRROLATE 0.8 MG: 0.2 INJECTION, SOLUTION INTRAMUSCULAR; INTRAVENOUS at 09:15

## 2019-07-24 RX ADMIN — MIDAZOLAM 2 MG: 1 INJECTION INTRAMUSCULAR; INTRAVENOUS at 06:39

## 2019-07-24 RX ADMIN — SODIUM CHLORIDE, POTASSIUM CHLORIDE, SODIUM LACTATE AND CALCIUM CHLORIDE: 600; 310; 30; 20 INJECTION, SOLUTION INTRAVENOUS at 08:00

## 2019-07-24 RX ADMIN — SODIUM CHLORIDE, POTASSIUM CHLORIDE, SODIUM LACTATE AND CALCIUM CHLORIDE 100 ML/HR: 600; 310; 30; 20 INJECTION, SOLUTION INTRAVENOUS at 06:02

## 2019-07-24 RX ADMIN — GLYCOPYRROLATE 0.2 MG: 0.2 INJECTION, SOLUTION INTRAMUSCULAR; INTRAVENOUS at 07:36

## 2019-07-24 RX ADMIN — SCOPALAMINE 1 PATCH: 1 PATCH, EXTENDED RELEASE TRANSDERMAL at 06:38

## 2019-07-24 RX ADMIN — VASOPRESSIN 0.5 UNITS: 20 INJECTION INTRAVENOUS at 08:52

## 2019-07-24 RX ADMIN — VASOPRESSIN 1 UNITS: 20 INJECTION INTRAVENOUS at 07:46

## 2019-07-24 RX ADMIN — FAMOTIDINE 20 MG: 10 INJECTION, SOLUTION INTRAVENOUS at 10:55

## 2019-07-24 RX ADMIN — OXYCODONE HYDROCHLORIDE AND ACETAMINOPHEN 1 TABLET: 10; 325 TABLET ORAL at 21:48

## 2019-07-24 RX ADMIN — OXYCODONE HYDROCHLORIDE AND ACETAMINOPHEN 1 TABLET: 10; 325 TABLET ORAL at 16:38

## 2019-07-24 RX ADMIN — VASOPRESSIN 0.5 UNITS: 20 INJECTION INTRAVENOUS at 08:43

## 2019-07-24 RX ADMIN — FAMOTIDINE 20 MG: 20 TABLET, FILM COATED ORAL at 21:49

## 2019-07-24 RX ADMIN — SODIUM CHLORIDE, PRESERVATIVE FREE 3 ML: 5 INJECTION INTRAVENOUS at 21:49

## 2019-07-24 RX ADMIN — VASOPRESSIN 1 UNITS: 20 INJECTION INTRAVENOUS at 07:39

## 2019-07-24 RX ADMIN — DEXAMETHASONE SODIUM PHOSPHATE 4 MG: 4 INJECTION, SOLUTION INTRAMUSCULAR; INTRAVENOUS at 06:38

## 2019-07-24 RX ADMIN — DIAZEPAM 5 MG: 5 TABLET ORAL at 13:32

## 2019-07-24 RX ADMIN — SODIUM CHLORIDE, POTASSIUM CHLORIDE, SODIUM LACTATE AND CALCIUM CHLORIDE 20 ML/HR: 600; 310; 30; 20 INJECTION, SOLUTION INTRAVENOUS at 06:00

## 2019-07-24 RX ADMIN — SODIUM CHLORIDE, PRESERVATIVE FREE 3 ML: 5 INJECTION INTRAVENOUS at 21:46

## 2019-07-24 RX ADMIN — ONDANSETRON HYDROCHLORIDE 4 MG: 2 SOLUTION INTRAMUSCULAR; INTRAVENOUS at 09:08

## 2019-07-24 RX ADMIN — VASOPRESSIN 1 UNITS: 20 INJECTION INTRAVENOUS at 07:30

## 2019-07-24 RX ADMIN — VASOPRESSIN 1 UNITS: 20 INJECTION INTRAVENOUS at 07:54

## 2019-07-24 RX ADMIN — OXYCODONE HYDROCHLORIDE 20 MG: 20 TABLET, FILM COATED, EXTENDED RELEASE ORAL at 06:12

## 2019-07-24 RX ADMIN — FAMOTIDINE 20 MG: 10 INJECTION INTRAVENOUS at 06:39

## 2019-07-24 RX ADMIN — ROCURONIUM BROMIDE 20 MG: 10 INJECTION INTRAVENOUS at 08:07

## 2019-07-24 RX ADMIN — FENTANYL CITRATE 50 MCG: 50 INJECTION, SOLUTION INTRAMUSCULAR; INTRAVENOUS at 07:21

## 2019-07-24 RX ADMIN — VASOPRESSIN 1 UNITS: 20 INJECTION INTRAVENOUS at 08:10

## 2019-07-24 RX ADMIN — LIDOCAINE HYDROCHLORIDE 40 MG: 20 INJECTION, SOLUTION INFILTRATION; PERINEURAL at 07:21

## 2019-07-24 RX ADMIN — Medication 5 MG: at 09:15

## 2019-07-24 NOTE — ANESTHESIA POSTPROCEDURE EVALUATION
Patient: Garland Luong    Procedure Summary     Date:  07/24/19 Room / Location:  [facility] OR  /  [facility] OR    Anesthesia Start:  0710 Anesthesia Stop:  0947    Procedures:       ANTERIOR DECOMPRESSION, ANTERIOR LUMBAR INTERBODY FUSION WITH INSTRUMENTATION L5-S1 (N/A Spine Lumbar)      ANTERIOR LUMBAR EXPOSURE (N/A Abdomen) Diagnosis:  (M54.16)    Surgeon:  CARLOS Morocho MD; Joon Zavala DO Provider:  Luis Antonio Phelps CRNA    Anesthesia Type:  general ASA Status:  2          Anesthesia Type: general  Last vitals  BP   109/66 (07/24/19 1032)   Temp   96.4 °F (35.8 °C) (07/24/19 1032)   Pulse   55 (07/24/19 1032)   Resp   12 (07/24/19 1032)     SpO2   98 % (07/24/19 1032)     Post Anesthesia Care and Evaluation    Patient location during evaluation: PACU  Patient participation: complete - patient participated  Level of consciousness: awake and alert  Pain management: adequate  Airway patency: patent  Anesthetic complications: No anesthetic complications  PONV Status: none  Cardiovascular status: acceptable and hemodynamically stable  Respiratory status: acceptable  Hydration status: acceptable    Comments: Blood pressure 109/66, pulse 55, temperature 96.4 °F (35.8 °C), temperature source Axillary, resp. rate 12, SpO2 98 %.    Patient discharged from PACU based upon Angelique score. Please see RN notes for further details

## 2019-07-24 NOTE — ANESTHESIA PROCEDURE NOTES
Airway  Urgency: elective    Airway not difficult    General Information and Staff    Patient location during procedure: OR  CRNA: Luis Antonio Phelps CRNA    Indications and Patient Condition  Indications for airway management: airway protection    Preoxygenated: yes  Mask difficulty assessment: 1 - vent by mask    Final Airway Details  Final airway type: endotracheal airway      Successful airway: ETT  Cuffed: yes   Successful intubation technique: video laryngoscopy  Facilitating devices/methods: intubating stylet  Endotracheal tube insertion site: oral  Blade: Damon  Blade size: 4  ETT size (mm): 7.5  Cormack-Lehane Classification: grade I - full view of glottis  Placement verified by: chest auscultation and capnometry   Measured from: lips  ETT to lips (cm): 23  Number of attempts at approach: 1    Additional Comments  Atraumatic intubation  Hx of ACDF

## 2019-07-24 NOTE — ANESTHESIA PREPROCEDURE EVALUATION
Anesthesia Evaluation     Patient summary reviewed   history of anesthetic complications: PONV  NPO Solid Status: > 8 hours             Airway   Mallampati: II  TM distance: >3 FB  Neck ROM: full  Dental      Pulmonary    (-) COPD, asthma, sleep apnea, not a smoker  Cardiovascular   Exercise tolerance: good (4-7 METS)    ECG reviewed  Patient on routine beta blocker and Beta blocker given within 24 hours of surgery    (+) hypertension,   (-) pacemaker, past MI, angina, cardiac stents      Neuro/Psych  (-) seizures, TIA, CVA  GI/Hepatic/Renal/Endo    (+)  GERD,    (-) liver disease, no renal disease, diabetes    Musculoskeletal     (+) back pain, neck pain,   Abdominal    Substance History      OB/GYN          Other                        Anesthesia Plan    ASA 2     general     intravenous induction   Anesthetic plan, all risks, benefits, and alternatives have been provided, discussed and informed consent has been obtained with: patient.

## 2019-07-25 VITALS
WEIGHT: 219 LBS | HEIGHT: 74 IN | DIASTOLIC BLOOD PRESSURE: 68 MMHG | RESPIRATION RATE: 18 BRPM | SYSTOLIC BLOOD PRESSURE: 111 MMHG | BODY MASS INDEX: 28.11 KG/M2 | OXYGEN SATURATION: 99 % | HEART RATE: 56 BPM | TEMPERATURE: 98.6 F

## 2019-07-25 LAB
ANION GAP SERPL CALCULATED.3IONS-SCNC: 5 MMOL/L (ref 4–13)
BASOPHILS # BLD AUTO: 0.02 10*3/MM3 (ref 0–0.2)
BASOPHILS NFR BLD AUTO: 0.2 % (ref 0–2)
BUN BLD-MCNC: 16 MG/DL (ref 5–21)
BUN/CREAT SERPL: 14.7 (ref 7–25)
CALCIUM SPEC-SCNC: 8.9 MG/DL (ref 8.4–10.4)
CHLORIDE SERPL-SCNC: 107 MMOL/L (ref 98–110)
CO2 SERPL-SCNC: 27 MMOL/L (ref 24–31)
CREAT BLD-MCNC: 1.09 MG/DL (ref 0.5–1.4)
DEPRECATED RDW RBC AUTO: 40.9 FL (ref 40–54)
EOSINOPHIL # BLD AUTO: 0.01 10*3/MM3 (ref 0–0.7)
EOSINOPHIL NFR BLD AUTO: 0.1 % (ref 0–4)
ERYTHROCYTE [DISTWIDTH] IN BLOOD BY AUTOMATED COUNT: 12.4 % (ref 12–15)
GFR SERPL CREATININE-BSD FRML MDRD: 70 ML/MIN/1.73
GLUCOSE BLD-MCNC: 110 MG/DL (ref 70–100)
HCT VFR BLD AUTO: 35.6 % (ref 40–52)
HGB BLD-MCNC: 12.1 G/DL (ref 14–18)
IMM GRANULOCYTES # BLD AUTO: 0.06 10*3/MM3 (ref 0–0.05)
IMM GRANULOCYTES NFR BLD AUTO: 0.5 % (ref 0–5)
LYMPHOCYTES # BLD AUTO: 2.6 10*3/MM3 (ref 0.72–4.86)
LYMPHOCYTES NFR BLD AUTO: 20.7 % (ref 15–45)
MCH RBC QN AUTO: 30.6 PG (ref 28–32)
MCHC RBC AUTO-ENTMCNC: 34 G/DL (ref 33–36)
MCV RBC AUTO: 90.1 FL (ref 82–95)
MONOCYTES # BLD AUTO: 0.98 10*3/MM3 (ref 0.19–1.3)
MONOCYTES NFR BLD AUTO: 7.8 % (ref 4–12)
NEUTROPHILS # BLD AUTO: 8.87 10*3/MM3 (ref 1.87–8.4)
NEUTROPHILS NFR BLD AUTO: 70.7 % (ref 39–78)
NRBC BLD AUTO-RTO: 0 /100 WBC (ref 0–0.2)
PLATELET # BLD AUTO: 146 10*3/MM3 (ref 130–400)
PMV BLD AUTO: 10.7 FL (ref 6–12)
POTASSIUM BLD-SCNC: 4.8 MMOL/L (ref 3.5–5.3)
RBC # BLD AUTO: 3.95 10*6/MM3 (ref 4.8–5.9)
SODIUM BLD-SCNC: 139 MMOL/L (ref 135–145)
WBC NRBC COR # BLD: 12.54 10*3/MM3 (ref 4.8–10.8)

## 2019-07-25 PROCEDURE — 80048 BASIC METABOLIC PNL TOTAL CA: CPT | Performed by: ORTHOPAEDIC SURGERY

## 2019-07-25 PROCEDURE — 85025 COMPLETE CBC W/AUTO DIFF WBC: CPT | Performed by: ORTHOPAEDIC SURGERY

## 2019-07-25 PROCEDURE — 97116 GAIT TRAINING THERAPY: CPT

## 2019-07-25 PROCEDURE — 97535 SELF CARE MNGMENT TRAINING: CPT | Performed by: OCCUPATIONAL THERAPIST

## 2019-07-25 PROCEDURE — 25010000003 CEFAZOLIN 1-4 GM/50ML-% SOLUTION: Performed by: ORTHOPAEDIC SURGERY

## 2019-07-25 RX ORDER — OXYCODONE AND ACETAMINOPHEN 10; 325 MG/1; MG/1
1 TABLET ORAL EVERY 4 HOURS PRN
Start: 2019-07-25 | End: 2019-08-03

## 2019-07-25 RX ADMIN — CEFAZOLIN SODIUM 1 G: 1 INJECTION, SOLUTION INTRAVENOUS at 06:06

## 2019-07-25 RX ADMIN — OXYCODONE HYDROCHLORIDE AND ACETAMINOPHEN 1 TABLET: 10; 325 TABLET ORAL at 07:56

## 2019-07-25 RX ADMIN — ONDANSETRON 4 MG: 4 TABLET, FILM COATED ORAL at 09:00

## 2019-07-25 RX ADMIN — SODIUM CHLORIDE, PRESERVATIVE FREE 3 ML: 5 INJECTION INTRAVENOUS at 08:01

## 2019-07-25 RX ADMIN — SODIUM CHLORIDE, PRESERVATIVE FREE 3 ML: 5 INJECTION INTRAVENOUS at 08:00

## 2019-07-25 RX ADMIN — SODIUM CHLORIDE 75 ML/HR: 9 INJECTION, SOLUTION INTRAVENOUS at 00:46

## 2019-07-25 RX ADMIN — FLUOXETINE HYDROCHLORIDE 20 MG: 20 CAPSULE ORAL at 08:00

## 2019-07-25 RX ADMIN — FAMOTIDINE 20 MG: 20 TABLET, FILM COATED ORAL at 08:00

## 2019-07-25 RX ADMIN — GABAPENTIN 300 MG: 300 CAPSULE ORAL at 08:00

## 2019-07-25 RX ADMIN — ATENOLOL 100 MG: 100 TABLET ORAL at 08:00

## 2022-02-15 ENCOUNTER — TRANSCRIBE ORDERS (OUTPATIENT)
Dept: ADMINISTRATIVE | Facility: HOSPITAL | Age: 57
End: 2022-02-15

## 2022-02-15 DIAGNOSIS — M54.16 LUMBAR RADICULOPATHY: Primary | ICD-10-CM

## 2022-02-21 ENCOUNTER — HOSPITAL ENCOUNTER (OUTPATIENT)
Dept: CT IMAGING | Facility: HOSPITAL | Age: 57
Discharge: HOME | End: 2022-02-21
Admitting: ORTHOPAEDIC SURGERY

## 2022-02-21 DIAGNOSIS — M54.16 LUMBAR RADICULOPATHY: ICD-10-CM

## 2022-02-21 PROCEDURE — 72131 CT LUMBAR SPINE W/O DYE: CPT

## 2023-03-09 NOTE — PROGRESS NOTES
Subjective    Mr. Luong is 58 y.o. male    Chief Complaint: Elevated PSA    History of Present Illness  Patient referred for elevated PSA.  PSA of 5.2.  Previous PSA 2022 was 4.  No family history of prostate cancer.  No previous history of prostate biopsy or elevation.  Patient did not fill out AUA symptom score today.   PSA- 5.2 (2/22/23)      The following portions of the patient's history were reviewed and updated as appropriate: allergies, current medications, past family history, past medical history, past social history, past surgical history and problem list.    Review of Systems   Constitutional: Negative for appetite change, chills and fever.   HENT: Negative for hearing loss and sore throat.    Eyes: Negative for pain and redness.   Respiratory: Negative for cough and shortness of breath.    Cardiovascular: Negative for chest pain and leg swelling.   Gastrointestinal: Negative for abdominal pain, anal bleeding, blood in stool, nausea and vomiting.   Endocrine: Negative for cold intolerance and heat intolerance.   Genitourinary: Negative for difficulty urinating, dysuria, flank pain, frequency, hematuria and urgency.   Musculoskeletal: Negative for joint swelling and myalgias.   Skin: Negative for color change and rash.   Allergic/Immunologic: Negative for immunocompromised state.   Neurological: Negative for dizziness and speech difficulty.   Hematological: Negative for adenopathy. Does not bruise/bleed easily.   Psychiatric/Behavioral: Negative for dysphoric mood and suicidal ideas.         Current Outpatient Medications:   •  atenolol (TENORMIN) 100 MG tablet, Take 1 tablet by mouth Daily., Disp: , Rfl:   •  diazePAM (VALIUM) 5 MG tablet, Take 1 tablet by mouth 2 (Two) Times a Day As Needed for Anxiety., Disp: , Rfl:   •  FLUoxetine (PROzac) 20 MG capsule, Take 1 capsule by mouth Daily., Disp: , Rfl:   •  gabapentin (NEURONTIN) 300 MG capsule, Take 1 capsule by mouth 3 (Three) Times a Day., Disp: ,  Rfl:   •  HYDROcodone-acetaminophen (NORCO) 7.5-325 MG per tablet, Take 1 tablet by mouth Every 6 (Six) Hours As Needed for Moderate Pain., Disp: , Rfl:   •  ondansetron (ZOFRAN) 4 MG tablet, Take 1 tablet by mouth Every 8 (Eight) Hours As Needed for Nausea or Vomiting., Disp: , Rfl:   •  promethazine (PHENERGAN) 25 MG tablet, Take 1 tablet by mouth Every 8 (Eight) Hours As Needed for Nausea or Vomiting., Disp: , Rfl:   •  raNITIdine (ZANTAC) 150 MG tablet, Take 1 tablet by mouth Daily., Disp: , Rfl:   •  salsalate (DISALCID) 750 MG tablet, Take 1 tablet by mouth 2 (Two) Times a Day., Disp: , Rfl:   •  cephalexin (KEFLEX) 500 MG capsule, Take 1 capsule by mouth 3 (Three) Times a Day for 3 days., Disp: 9 capsule, Rfl: 0    Past Medical History:   Diagnosis Date   • Acid reflux    • Anxiety    • Arthritis    • Bone spur     CERVICAL SPINE    • Chronic back pain    • Chronic neck pain    • Depression    • Difficulty swallowing    • Dizzy    • Frequent sinus infections    • Hypertension    • Nausea    • Panic attacks    • Pneumonia    • PONV (postoperative nausea and vomiting)    • Stomach ulcer    • Vertigo        Past Surgical History:   Procedure Laterality Date   • ANTERIOR CERVICAL DISCECTOMY W/ FUSION N/A 11/20/2017    Procedure: CERVICAL DISCECTOMY ANTERIOR FUSION WITH INSTRUMENTATION;  Surgeon: CARLOS Morocho MD;  Location:  [facility] OR;  Service:    • ANTERIOR LUMBAR EXPOSURE N/A 7/24/2019    Procedure: ANTERIOR LUMBAR EXPOSURE;  Surgeon: Joon Zavala DO;  Location:  [facility] OR;  Service: Vascular   • ARM LACERATION REPAIR Left 1977   • BACK SURGERY  1996    LOWER    • CERVICAL CORPECTOMY N/A 11/20/2017    Procedure: CERVICAL CORPECTOMY C-5, PARTIAL CORPECTOMY C-4, ANTERIOR CERVICAL DISCETOMY FUSION C6-7, ANTERIOR FUSION WITH INSTRUMENTATION C4-7;  Surgeon: CARLOS Morocho MD;  Location:  [facility] OR;  Service:    • ENDOSCOPY      JUNE 2017 AND JULY 2017 R/T SWALLOWING DIFFICULTIES    • HERNIA REPAIR    "  • LUMBAR FUSION N/A 7/24/2019    Procedure: ANTERIOR DECOMPRESSION, ANTERIOR LUMBAR INTERBODY FUSION WITH INSTRUMENTATION L5-S1;  Surgeon: CARLOS Morocho MD;  Location: [facility];  Service: Orthopedic Spine       Social History     Socioeconomic History   • Marital status: Single   Tobacco Use   • Smoking status: Former   • Smokeless tobacco: Former     Types: Chew   Vaping Use   • Vaping Use: Never used   Substance and Sexual Activity   • Alcohol use: No   • Drug use: No   • Sexual activity: Defer       Family History   Problem Relation Age of Onset   • Heart disease Mother    • Kidney disease Father        Objective    Temp 98.1 °F (36.7 °C)   Ht 188 cm (74\")   Wt 103 kg (226 lb 12.8 oz)   BMI 29.12 kg/m²     Physical Exam  Genitourinary:     Comments: 50 gm prostate smooth no nodules            Results for orders placed or performed during the hospital encounter of 07/24/19   Basic Metabolic Panel    Specimen: Blood   Result Value Ref Range    Glucose 110 (H) 70 - 100 mg/dL    BUN 16 5 - 21 mg/dL    Creatinine 1.09 0.50 - 1.40 mg/dL    Sodium 139 135 - 145 mmol/L    Potassium 4.8 3.5 - 5.3 mmol/L    Chloride 107 98 - 110 mmol/L    CO2 27.0 24.0 - 31.0 mmol/L    Calcium 8.9 8.4 - 10.4 mg/dL    eGFR Non African Amer 70 >60 mL/min/1.73    BUN/Creatinine Ratio 14.7 7.0 - 25.0    Anion Gap 5.0 4.0 - 13.0 mmol/L   CBC Auto Differential    Specimen: Blood   Result Value Ref Range    WBC 12.54 (H) 4.80 - 10.80 10*3/mm3    RBC 3.95 (L) 4.80 - 5.90 10*6/mm3    Hemoglobin 12.1 (L) 14.0 - 18.0 g/dL    Hematocrit 35.6 (L) 40.0 - 52.0 %    MCV 90.1 82.0 - 95.0 fL    MCH 30.6 28.0 - 32.0 pg    MCHC 34.0 33.0 - 36.0 g/dL    RDW 12.4 12.0 - 15.0 %    RDW-SD 40.9 40.0 - 54.0 fl    MPV 10.7 6.0 - 12.0 fL    Platelets 146 130 - 400 10*3/mm3    Neutrophil % 70.7 39.0 - 78.0 %    Lymphocyte % 20.7 15.0 - 45.0 %    Monocyte % 7.8 4.0 - 12.0 %    Eosinophil % 0.1 0.0 - 4.0 %    Basophil % 0.2 0.0 - 2.0 %    Immature Grans % " 0.5 0.0 - 5.0 %    Neutrophils, Absolute 8.87 (H) 1.87 - 8.40 10*3/mm3    Lymphocytes, Absolute 2.60 0.72 - 4.86 10*3/mm3    Monocytes, Absolute 0.98 0.19 - 1.30 10*3/mm3    Eosinophils, Absolute 0.01 0.00 - 0.70 10*3/mm3    Basophils, Absolute 0.02 0.00 - 0.20 10*3/mm3    Immature Grans, Absolute 0.06 (H) 0.00 - 0.05 10*3/mm3    nRBC 0.0 0.0 - 0.2 /100 WBC   Type & Screen    Specimen: Blood   Result Value Ref Range    ABO Type A     RH type Negative     Antibody Screen Negative     T&S Expiration Date 7/27/2019 11:59:59 PM        IPSS Questionnaire (AUA-7):  Incomplete emptying  Over the past month, how often have you had a sensation of not emptying your bladder completely after you finish?: Not at all (03/20/23 1054)  Frequency  Over the past month, how often have you had to urinate again less than two hours after you finishing urinating ?: Not at all (03/20/23 1054)  Intermittency  Over the past month, how often have you found you stopped and started again several time when you urinated ?: Less thank 1 time in 5 (03/20/23 1054)  Urgency  Over the last month, how difficult  have you found it to postpone urination ?: Less than 1 time in 5 (03/20/23 1054)  Weak Stream  Over the past month, how often have you had a weak urinary stream ?: Not at all (03/20/23 1054)  Straining  Over the past month, how often have you had to push or strain to begin urination ?: Less than 1 time 5 (03/20/23 1054)  Nocturia  Over the past month, how many times did you most typically get up to urinate from the time you went to bed until the time you got up in the morning ?: Less than 1 time in 5 (03/20/23 1054)  Quality of life due to urinary symptoms  If you were to spend the rest of your life with your urinary condition the way it is now, how would feel about that?: Pleased (03/20/23 1054)    Scores  Total IPSS Score: (!) 4 (03/20/23 1054)  Total Score = Symtomatic Level: Mildly symptomatic: 0-7 (03/20/23 1054)            Assessment and  Plan    Diagnoses and all orders for this visit:    1. Elevated prostate specific antigen (PSA) (Primary)  -     Cancel: POC Urinalysis Dipstick, Multipro  -     Biopsy Prostate; Future  -     cephalexin (KEFLEX) 500 MG capsule; Take 1 capsule by mouth 3 (Three) Times a Day for 3 days.  Dispense: 9 capsule; Refill: 0    2. BPH with urinary obstruction        Reviewed records with PSA increased from 4 to 5.2.    Regarding his PSA,  I have recommended a transrectal ultrasound guided prostate biopsy.  We discussed risks including hematuria,  hematochezia, hematospermia.  I discussed the 4% risk of infection requiring hospitalization.  Also discussed 1% risk of urinary retention.  I discussed my antimicrobial regimen which includes 3 days of antibiotics prior plus shot of IM gentamicin  The day of the procedure.  The patient voiced understanding of this and wishes to proceed.

## 2023-03-20 ENCOUNTER — OFFICE VISIT (OUTPATIENT)
Dept: UROLOGY | Facility: CLINIC | Age: 58
End: 2023-03-20
Payer: MEDICARE

## 2023-03-20 VITALS — TEMPERATURE: 98.1 F | WEIGHT: 226.8 LBS | BODY MASS INDEX: 29.11 KG/M2 | HEIGHT: 74 IN

## 2023-03-20 DIAGNOSIS — N13.8 BPH WITH URINARY OBSTRUCTION: ICD-10-CM

## 2023-03-20 DIAGNOSIS — R97.20 ELEVATED PROSTATE SPECIFIC ANTIGEN (PSA): Primary | ICD-10-CM

## 2023-03-20 DIAGNOSIS — N40.1 BPH WITH URINARY OBSTRUCTION: ICD-10-CM

## 2023-03-20 PROCEDURE — 99204 OFFICE O/P NEW MOD 45 MIN: CPT | Performed by: UROLOGY

## 2023-03-20 PROCEDURE — 1159F MED LIST DOCD IN RCRD: CPT | Performed by: UROLOGY

## 2023-03-20 PROCEDURE — 81001 URINALYSIS AUTO W/SCOPE: CPT | Performed by: UROLOGY

## 2023-03-20 PROCEDURE — 1160F RVW MEDS BY RX/DR IN RCRD: CPT | Performed by: UROLOGY

## 2023-03-20 RX ORDER — HYDROCODONE BITARTRATE AND ACETAMINOPHEN 7.5; 325 MG/1; MG/1
1 TABLET ORAL EVERY 6 HOURS PRN
COMMUNITY

## 2023-03-20 RX ORDER — CEPHALEXIN 500 MG/1
500 CAPSULE ORAL 3 TIMES DAILY
Qty: 9 CAPSULE | Refills: 0 | Status: SHIPPED | OUTPATIENT
Start: 2023-03-20 | End: 2023-03-23

## 2023-04-18 RX ORDER — GENTAMICIN SULFATE 40 MG/ML
80 INJECTION, SOLUTION INTRAMUSCULAR; INTRAVENOUS ONCE
Status: SHIPPED | OUTPATIENT
Start: 2023-04-18

## 2023-04-19 ENCOUNTER — PROCEDURE VISIT (OUTPATIENT)
Dept: UROLOGY | Facility: CLINIC | Age: 58
End: 2023-04-19
Payer: MEDICARE

## 2023-04-19 DIAGNOSIS — R97.20 ELEVATED PROSTATE SPECIFIC ANTIGEN (PSA): Primary | ICD-10-CM

## 2023-04-19 PROCEDURE — G0416 PROSTATE BIOPSY, ANY MTHD: HCPCS | Performed by: UROLOGY

## 2023-04-19 NOTE — PROGRESS NOTES
Indications:  Elevated PSA    Pre-operative prep:  fleets enema, oral antibiotic, IM gentamicin and stopped aspirin, coumadin, and other anticoagulants    Last PSA:  5.2    Procedure:    After proper identification of patient and procedure, patient was placed in the left later decubitus position.  2% lidocaine jelly was instilled per rectum  for topical anesthesia.  The ultrasound probe was gently inserted per rectum. Prostate was scanned from the base of the bladder to the apex.  20 cc of 1% lidocaine plain was then used to perform a prostate nerve block injecting the junction of the seminal vesicle and bladder laterally.      Prostate length: 6.12 cm    Prostate width: 5.46 cm    Prostate height: 4.38 cm    Prostate volume: 76.52 cc    PSA density: 0.07    Abnormal findings:  Transition zone enlargement    Median lobe:  no    A total of 12 biopsies were taken from the base, apex, and mid portion of the gland on both the right and the left sides.     Patient tolerated the procedure well    Complications: none    Estimated blood loss: minimal    Mr. Luong was given instructions for follow up.  He will notify the office if he has excessive hematuria, hematochezia, fevers, perineal, or abdominal pain.    Follow up:   Call with pathology results and schedule follow up.

## 2023-04-21 LAB
CYTO UR: NORMAL
LAB AP CASE REPORT: NORMAL
Lab: NORMAL
PATH REPORT.FINAL DX SPEC: NORMAL
PATH REPORT.GROSS SPEC: NORMAL

## 2023-04-26 ENCOUNTER — TELEPHONE (OUTPATIENT)
Dept: UROLOGY | Facility: CLINIC | Age: 58
End: 2023-04-26
Payer: OTHER MISCELLANEOUS

## 2023-04-26 DIAGNOSIS — R97.20 ELEVATED PROSTATE SPECIFIC ANTIGEN (PSA): Primary | ICD-10-CM

## 2023-04-26 NOTE — TELEPHONE ENCOUNTER
Called and discussed pathology with patient.  All questions answered.  Plan to follow up in six months with PSA.

## 2023-10-24 ENCOUNTER — TELEPHONE (OUTPATIENT)
Dept: UROLOGY | Facility: CLINIC | Age: 58
End: 2023-10-24
Payer: MEDICARE

## 2023-10-24 NOTE — TELEPHONE ENCOUNTER
Called Patient to remind them to get PSA prior to appointment.  Spoke with Patient.  He said we do not take his insurance so he wants to cancel. If patient calls back it is ok for the [facility] to tell the pt the message.

## 2023-11-17 NOTE — TELEPHONE ENCOUNTER
Patient is wanting to cancel his appt for 2/5 Please add him on for 11/28/23, OK to double book in the morning.

## 2024-08-15 ENCOUNTER — OFFICE VISIT (OUTPATIENT)
Dept: FAMILY MEDICINE CLINIC | Facility: CLINIC | Age: 59
End: 2024-08-15
Payer: MEDICARE

## 2024-08-15 ENCOUNTER — TELEPHONE (OUTPATIENT)
Dept: FAMILY MEDICINE CLINIC | Facility: CLINIC | Age: 59
End: 2024-08-15

## 2024-08-15 VITALS
WEIGHT: 225 LBS | HEART RATE: 72 BPM | TEMPERATURE: 98.2 F | OXYGEN SATURATION: 98 % | HEIGHT: 74 IN | DIASTOLIC BLOOD PRESSURE: 80 MMHG | BODY MASS INDEX: 28.88 KG/M2 | SYSTOLIC BLOOD PRESSURE: 130 MMHG

## 2024-08-15 DIAGNOSIS — M25.511 CHRONIC RIGHT SHOULDER PAIN: Primary | ICD-10-CM

## 2024-08-15 DIAGNOSIS — G89.29 CHRONIC RIGHT SHOULDER PAIN: Primary | ICD-10-CM

## 2024-08-15 PROBLEM — F41.9 ANXIETY: Status: ACTIVE | Noted: 2019-01-15

## 2024-08-15 PROCEDURE — 99203 OFFICE O/P NEW LOW 30 MIN: CPT | Performed by: NURSE PRACTITIONER

## 2024-08-15 PROCEDURE — 3079F DIAST BP 80-89 MM HG: CPT | Performed by: NURSE PRACTITIONER

## 2024-08-15 PROCEDURE — 3075F SYST BP GE 130 - 139MM HG: CPT | Performed by: NURSE PRACTITIONER

## 2024-08-15 RX ORDER — TAMSULOSIN HYDROCHLORIDE 0.4 MG/1
1 CAPSULE ORAL DAILY
COMMUNITY

## 2024-08-15 NOTE — PROGRESS NOTES
"Chief Complaint   Patient presents with    Establish Care     New pt here to establish care and get medication refills.        Subjective   Garland Luong is a 59 y.o. male who presents today for the following         Pain  This is a chronic (Patient has had right shoulder pain since the begining of the year.) problem. The problem occurs constantly. The problem has been gradually worsening. Exacerbated by: any movement. Treatments tried: patient has had steroid injection in shoulder in the past and it did help.Patient has tried Naproxen, ibuprofen without any relief.   The patient had a CT scan of shoulder at [facility].        Allergies   Allergen Reactions    Aspirin GI Intolerance     HISTORY OF ULCERS    Ibuprofen GI Intolerance     HISTORY OF ULCERS     Nsaids GI Intolerance     HISTORY OF ULCERS         OBJECTIVE:  Vitals:    08/15/24 1456   BP: 130/80   BP Location: Left arm   Patient Position: Sitting   Cuff Size: Adult   Pulse: 72   Temp: 98.2 °F (36.8 °C)   TempSrc: Infrared   SpO2: 98%   Weight: 102 kg (225 lb)   Height: 188 cm (74\")     Physical Exam  Vitals and nursing note reviewed.   Constitutional:       Appearance: Normal appearance.   HENT:      Head: Normocephalic and atraumatic.      Nose: Nose normal.   Eyes:      Pupils: Pupils are equal, round, and reactive to light.   Cardiovascular:      Rate and Rhythm: Normal rate and regular rhythm.   Pulmonary:      Breath sounds: Normal breath sounds. No wheezing or rhonchi.   Abdominal:      General: Bowel sounds are normal.   Musculoskeletal:      Right shoulder: Tenderness present. Decreased range of motion.   Skin:     General: Skin is warm and dry.   Neurological:      Mental Status: He is alert and oriented to person, place, and time.   Psychiatric:         Mood and Affect: Mood normal.         Behavior: Behavior normal.         Thought Content: Thought content normal.         Judgment: Judgment normal.         BMI is >= 25 and <30. " (Overweight) The following options were offered after discussion;: exercise counseling/recommendations and nutrition counseling/recommendations          ASSESSMENT/ PLAN:    Diagnoses and all orders for this visit:    1. Chronic right shoulder pain (Primary)  -     Ambulatory Referral to Orthopedic Surgery      Procedures       Follow-up: Return in about 2 months (around 10/15/2024).      Marylou Gallardo, HUMBLE 8/15/2024 16:30 CDT  This note was electronically signed.

## 2024-08-29 ENCOUNTER — TRANSCRIBE ORDERS (OUTPATIENT)
Dept: ADMINISTRATIVE | Facility: HOSPITAL | Age: 59
End: 2024-08-29
Payer: MEDICARE

## 2024-08-29 DIAGNOSIS — M25.511 RIGHT SHOULDER PAIN, UNSPECIFIED CHRONICITY: Primary | ICD-10-CM

## 2024-09-17 ENCOUNTER — TELEPHONE (OUTPATIENT)
Dept: FAMILY MEDICINE CLINIC | Facility: CLINIC | Age: 59
End: 2024-09-17
Payer: MEDICARE

## 2024-09-23 ENCOUNTER — HOSPITAL ENCOUNTER (OUTPATIENT)
Dept: MRI IMAGING | Facility: HOSPITAL | Age: 59
Discharge: HOME | End: 2024-09-23
Admitting: ORTHOPAEDIC SURGERY
Payer: MEDICARE

## 2024-09-23 DIAGNOSIS — M25.511 RIGHT SHOULDER PAIN, UNSPECIFIED CHRONICITY: ICD-10-CM

## 2024-09-23 PROCEDURE — 73221 MRI JOINT UPR EXTREM W/O DYE: CPT

## 2024-10-10 ENCOUNTER — OFFICE VISIT (OUTPATIENT)
Dept: FAMILY MEDICINE CLINIC | Facility: CLINIC | Age: 59
End: 2024-10-10
Payer: MEDICARE

## 2024-10-10 VITALS
SYSTOLIC BLOOD PRESSURE: 120 MMHG | TEMPERATURE: 98 F | HEART RATE: 84 BPM | HEIGHT: 74 IN | DIASTOLIC BLOOD PRESSURE: 81 MMHG | WEIGHT: 226 LBS | BODY MASS INDEX: 29 KG/M2 | OXYGEN SATURATION: 96 %

## 2024-10-10 DIAGNOSIS — J06.9 VIRAL UPPER RESPIRATORY TRACT INFECTION: Primary | ICD-10-CM

## 2024-10-10 DIAGNOSIS — R05.1 ACUTE COUGH: ICD-10-CM

## 2024-10-10 LAB
EXPIRATION DATE: NORMAL
FLUAV AG UPPER RESP QL IA.RAPID: NOT DETECTED
FLUBV AG UPPER RESP QL IA.RAPID: NOT DETECTED
INTERNAL CONTROL: NORMAL
Lab: NORMAL
SARS-COV-2 AG UPPER RESP QL IA.RAPID: NOT DETECTED

## 2024-10-10 PROCEDURE — 87428 SARSCOV & INF VIR A&B AG IA: CPT | Performed by: NURSE PRACTITIONER

## 2024-10-10 PROCEDURE — 3074F SYST BP LT 130 MM HG: CPT | Performed by: NURSE PRACTITIONER

## 2024-10-10 PROCEDURE — 99213 OFFICE O/P EST LOW 20 MIN: CPT | Performed by: NURSE PRACTITIONER

## 2024-10-10 PROCEDURE — 3079F DIAST BP 80-89 MM HG: CPT | Performed by: NURSE PRACTITIONER

## 2024-10-10 RX ORDER — AZITHROMYCIN 250 MG/1
TABLET, FILM COATED ORAL
Qty: 6 TABLET | Refills: 0 | Status: SHIPPED | OUTPATIENT
Start: 2024-10-10

## 2024-10-10 RX ORDER — METHYLPREDNISOLONE 4 MG
TABLET, DOSE PACK ORAL
Qty: 21 TABLET | Refills: 0 | Status: SHIPPED | OUTPATIENT
Start: 2024-10-10

## 2024-10-10 NOTE — PROGRESS NOTES
"Chief Complaint   Patient presents with    Fever    Generalized Body Aches        Subjective   Garland Luong is a 59 y.o. male who presents today for the following     Cough  This is a new problem. Episode onset: started on Monday October 7th. The problem occurs intermittent. The cough is Productive of sputum. Associated symptoms include chills, a fever and shortness of breath. Treatments tried: vitamins and tylenol. The treatment provided no relief.          Allergies   Allergen Reactions    Aspirin GI Intolerance     HISTORY OF ULCERS    Ibuprofen GI Intolerance     HISTORY OF ULCERS     Nsaids GI Intolerance     HISTORY OF ULCERS         OBJECTIVE:  Vitals:    10/10/24 1351   BP: 120/81   BP Location: Left arm   Patient Position: Sitting   Cuff Size: Large Adult   Pulse: 84   Temp: 98 °F (36.7 °C)   SpO2: 96%   Weight: 103 kg (226 lb)   Height: 188 cm (74\")     Physical Exam  Vitals and nursing note reviewed.   Constitutional:       Appearance: Normal appearance.   HENT:      Head: Normocephalic and atraumatic.      Right Ear: There is impacted cerumen.      Left Ear: There is impacted cerumen.      Nose: Nose normal.   Eyes:      Pupils: Pupils are equal, round, and reactive to light.   Cardiovascular:      Rate and Rhythm: Normal rate and regular rhythm.   Pulmonary:      Breath sounds: Normal breath sounds. No wheezing or rhonchi.   Abdominal:      General: Bowel sounds are normal.   Skin:     General: Skin is warm and dry.   Neurological:      Mental Status: He is alert and oriented to person, place, and time.   Psychiatric:         Mood and Affect: Mood normal.         Behavior: Behavior normal.         Thought Content: Thought content normal.         Judgment: Judgment normal.                    ASSESSMENT/ PLAN:    Diagnoses and all orders for this visit:    1. Viral upper respiratory tract infection (Primary)  -     methylPREDNISolone (MEDROL) 4 MG dose pack; Take as directed on package instructions.  " Dispense: 21 tablet; Refill: 0  -     azithromycin (Zithromax Z-Pranay) 250 MG tablet; Take 2 tablets by mouth on day 1, then 1 tablet daily on days 2-5  Dispense: 6 tablet; Refill: 0    2. Acute cough  -     POCT SARS-CoV-2 Antigen RUCHI + Flu  -     XR Chest PA & Lateral; Future    Increase fluids and rest. Chest x-ray order given to patient if symptoms do not improve.     Procedures       Follow-up: Return if symptoms worsen or fail to improve.      Marylou Gallardo, APRN 10/10/2024 16:43 CDT  This note was electronically signed.

## 2024-11-01 ENCOUNTER — OFFICE VISIT (OUTPATIENT)
Dept: FAMILY MEDICINE CLINIC | Facility: CLINIC | Age: 59
End: 2024-11-01
Payer: MEDICARE

## 2024-11-01 VITALS
BODY MASS INDEX: 28.91 KG/M2 | WEIGHT: 225.3 LBS | DIASTOLIC BLOOD PRESSURE: 84 MMHG | RESPIRATION RATE: 19 BRPM | OXYGEN SATURATION: 97 % | HEIGHT: 74 IN | SYSTOLIC BLOOD PRESSURE: 124 MMHG | TEMPERATURE: 98.2 F | HEART RATE: 83 BPM

## 2024-11-01 DIAGNOSIS — G89.29 CHRONIC BILATERAL LOW BACK PAIN WITHOUT SCIATICA: Chronic | ICD-10-CM

## 2024-11-01 DIAGNOSIS — G89.29 CHRONIC NECK PAIN: Chronic | ICD-10-CM

## 2024-11-01 DIAGNOSIS — M25.511 CHRONIC RIGHT SHOULDER PAIN: Chronic | ICD-10-CM

## 2024-11-01 DIAGNOSIS — G89.29 CHRONIC RIGHT SHOULDER PAIN: Chronic | ICD-10-CM

## 2024-11-01 DIAGNOSIS — Z23 NEEDS FLU SHOT: ICD-10-CM

## 2024-11-01 DIAGNOSIS — Z79.899 LONG-TERM USE OF HIGH-RISK MEDICATION: Primary | ICD-10-CM

## 2024-11-01 DIAGNOSIS — M54.2 CHRONIC NECK PAIN: Chronic | ICD-10-CM

## 2024-11-01 DIAGNOSIS — M54.50 CHRONIC BILATERAL LOW BACK PAIN WITHOUT SCIATICA: Chronic | ICD-10-CM

## 2024-11-01 DIAGNOSIS — F41.9 ANXIETY: Chronic | ICD-10-CM

## 2024-11-01 RX ORDER — ATENOLOL 50 MG/1
50 TABLET ORAL DAILY
Qty: 30 TABLET | Refills: 5 | Status: SHIPPED | OUTPATIENT
Start: 2024-11-01 | End: 2024-12-01

## 2024-11-01 RX ORDER — GABAPENTIN 300 MG/1
300 CAPSULE ORAL 3 TIMES DAILY
Qty: 90 CAPSULE | Refills: 2 | Status: SHIPPED | OUTPATIENT
Start: 2024-11-01

## 2024-11-01 RX ORDER — DIAZEPAM 5 MG/1
5 TABLET ORAL 2 TIMES DAILY PRN
Qty: 60 TABLET | Refills: 0 | Status: SHIPPED | OUTPATIENT
Start: 2024-11-01

## 2024-11-01 NOTE — PROGRESS NOTES
"Chief Complaint   Patient presents with    Anxiety     Due for refill of Valium    Shoulder Pain     chronic        Subjective   Garland Luong is a 59 y.o. male who presents today for the following     Anxiety  Presents for follow-up visit.  Symptoms include palpitations.  Patient reports no suicidal ideas. Past treatments include benzodiazephines and SSRIs. The treatment provided significant relief.      Right shoulder-This is a chronic (Patient has had right shoulder pain since the begining of the year.) problem. The problem occurs constantly. The problem has been gradually worsening. Exacerbated by: any movement. Treatments tried: patient has had steroid injection in shoulder in the past and it did help.Patient has tried Naproxen, ibuprofen without any relief.   The patient had a CT scan of shoulder at [facility].  Patient has appt with ortho in December.   Neck pain- Patient has a history of neck surgery. Patient takes norco for pain as needed. Denies any side effects from medication. Patient also has a history of chronic back pain. He has had multiple back surgeries.       Allergies   Allergen Reactions    Aspirin GI Intolerance     HISTORY OF ULCERS    Ibuprofen GI Intolerance     HISTORY OF ULCERS     Nsaids GI Intolerance     HISTORY OF ULCERS         OBJECTIVE:  Vitals:    11/01/24 1420   BP: 124/84   BP Location: Left arm   Patient Position: Sitting   Cuff Size: Adult   Pulse: 83   Resp: 19   Temp: 98.2 °F (36.8 °C)   TempSrc: Infrared   SpO2: 97%   Weight: 102 kg (225 lb 4.8 oz)   Height: 188 cm (74\")     Physical Exam  Vitals and nursing note reviewed.   Constitutional:       Appearance: Normal appearance.   HENT:      Head: Normocephalic and atraumatic.      Nose: Nose normal.   Cardiovascular:      Rate and Rhythm: Normal rate and regular rhythm.   Pulmonary:      Breath sounds: Normal breath sounds. No wheezing or rhonchi.   Abdominal:      General: Bowel sounds are normal. "   Musculoskeletal:      Right shoulder: Decreased range of motion.      Cervical back: Decreased range of motion.      Thoracic back: Decreased range of motion.      Lumbar back: Decreased range of motion.   Skin:     General: Skin is warm and dry.   Neurological:      Mental Status: He is alert and oriented to person, place, and time.   Psychiatric:         Mood and Affect: Mood normal.         Behavior: Behavior normal.         Thought Content: Thought content normal.         Judgment: Judgment normal.                    ASSESSMENT/ PLAN:    Diagnoses and all orders for this visit:    1. Long-term use of high-risk medication (Primary)  -     Compliance Drug Analysis, Ur - Urine, Clean Catch    2. Anxiety  -     Compliance Drug Analysis, Ur - Urine, Clean Catch  -     atenolol (Tenormin) 50 MG tablet; Take 1 tablet by mouth Daily for 30 days.  Dispense: 30 tablet; Refill: 5  -     diazePAM (VALIUM) 5 MG tablet; Take 1 tablet by mouth 2 (Two) Times a Day As Needed for Anxiety.  Dispense: 60 tablet; Refill: 0    3. Chronic bilateral low back pain without sciatica    4. Chronic right shoulder pain    5. Chronic neck pain  -     gabapentin (NEURONTIN) 300 MG capsule; Take 1 capsule by mouth 3 (Three) Times a Day.  Dispense: 90 capsule; Refill: 2    6. Needs flu shot  -     Fluzone >6mos (0251-4155)      Procedures       Follow-up: Return in about 3 months (around 2/1/2025) for pain, anxiety.      Marylou Gallardo, HUMBLE 11/1/2024 15:25 CDT  This note was electronically signed.

## 2024-11-11 LAB — DRUGS UR: NORMAL

## 2024-12-19 ENCOUNTER — OFFICE VISIT (OUTPATIENT)
Dept: FAMILY MEDICINE CLINIC | Facility: CLINIC | Age: 59
End: 2024-12-19
Payer: MEDICARE

## 2024-12-19 VITALS
BODY MASS INDEX: 29.39 KG/M2 | HEIGHT: 74 IN | HEART RATE: 84 BPM | RESPIRATION RATE: 17 BRPM | WEIGHT: 229 LBS | TEMPERATURE: 97.7 F | DIASTOLIC BLOOD PRESSURE: 75 MMHG | OXYGEN SATURATION: 97 % | SYSTOLIC BLOOD PRESSURE: 115 MMHG

## 2024-12-19 DIAGNOSIS — M54.2 CHRONIC NECK PAIN: Chronic | ICD-10-CM

## 2024-12-19 DIAGNOSIS — G89.29 CHRONIC NECK PAIN: Chronic | ICD-10-CM

## 2024-12-19 DIAGNOSIS — F41.9 ANXIETY: Chronic | ICD-10-CM

## 2024-12-19 PROCEDURE — 3078F DIAST BP <80 MM HG: CPT | Performed by: NURSE PRACTITIONER

## 2024-12-19 PROCEDURE — 99213 OFFICE O/P EST LOW 20 MIN: CPT | Performed by: NURSE PRACTITIONER

## 2024-12-19 PROCEDURE — 1125F AMNT PAIN NOTED PAIN PRSNT: CPT | Performed by: NURSE PRACTITIONER

## 2024-12-19 PROCEDURE — 1160F RVW MEDS BY RX/DR IN RCRD: CPT | Performed by: NURSE PRACTITIONER

## 2024-12-19 PROCEDURE — 3074F SYST BP LT 130 MM HG: CPT | Performed by: NURSE PRACTITIONER

## 2024-12-19 PROCEDURE — 1159F MED LIST DOCD IN RCRD: CPT | Performed by: NURSE PRACTITIONER

## 2024-12-19 RX ORDER — DIAZEPAM 5 MG/1
5 TABLET ORAL 2 TIMES DAILY PRN
Qty: 60 TABLET | Refills: 0 | Status: SHIPPED | OUTPATIENT
Start: 2024-12-19

## 2024-12-19 RX ORDER — HYDROCODONE BITARTRATE AND ACETAMINOPHEN 7.5; 325 MG/1; MG/1
1 TABLET ORAL EVERY 12 HOURS PRN
Qty: 60 TABLET | Refills: 0 | Status: SHIPPED | OUTPATIENT
Start: 2024-12-19 | End: 2025-01-18

## 2024-12-19 RX ORDER — GABAPENTIN 300 MG/1
300 CAPSULE ORAL 3 TIMES DAILY
Qty: 90 CAPSULE | Refills: 2 | Status: SHIPPED | OUTPATIENT
Start: 2024-12-19

## 2024-12-19 RX ORDER — HYDROCODONE BITARTRATE AND ACETAMINOPHEN 10; 325 MG/1; MG/1
1 TABLET ORAL EVERY 6 HOURS PRN
COMMUNITY
Start: 2024-12-03

## 2024-12-19 NOTE — PATIENT INSTRUCTIONS
Opioid Pain Medicine Management  Opioid pain medicines are strong medicines that are used to treat bad or very bad pain. When you take them for a short time, they can help you:  Sleep better.  Do better in physical therapy.  Feel better during the first few days after you get hurt.  Recover from surgery.  Only take these medicines if a doctor says that you can. You should only take them for a short time. This is because opioids can be very addictive. This means that they are hard to stop taking. The longer you take opioids, the harder it may be to stop taking them.  What are the risks?  Opioids can cause problems (side effects). Taking them for more than 3 days raises your chance of problems, such as:  Trouble pooping (constipation).  Feeling sick to your stomach (nausea).  Vomiting.  Feeling very sleepy.  Confusion.  Not being able to stop taking the medicine.  Breathing problems.  Taking opioids for a long time can make it hard for you to do daily tasks. It can also put you at risk for:  Car accidents.  Depression.  Suicide.  Heart attack.  Taking too much of the medicine (overdose). This can lead to death.  What is a pain treatment plan?  A pain treatment plan is a plan made by you and your doctor. Work with your doctor to make a plan for treating your pain. To help you do this:  Talk about the goals of your treatment, including:  How much pain you might expect to have.  How you will manage the pain.  Talk about the risks and benefits of taking these medicines for your condition.  Remember that a good treatment plan uses more than one approach and lowers the risks of side effects.  Tell your doctor about the amount of medicines you take and about any drug or alcohol use.  Get your pain medicine prescriptions from only one doctor.  Pain can be managed with other treatments. Work with your doctor to find other ways to help your pain, such as:  Physical therapy or doing gentle exercises.  Counseling.  Eating healthy  foods.  Massage.  Meditation.  Other pain medicines.  How to use opioid pain medicine safely  Taking medicine  Take your pain medicine exactly as told by your doctor. Take it only when you need it.  If your pain is not too bad, you may take less medicine if your doctor allows.  If you have no pain, do not take the medicine unless your doctor tells you to take it.  If your pain is very bad, do not take more medicine than your doctor told you to take. Call your doctor to know what to do.  Write down the times when you take your pain medicine. Look at the times before you take your next dose.  Take other over-the-counter or prescription medicines only as told by your doctor.  Keeping yourself and others safe    While you are taking opioids:  Do not drive, use machines, or power tools.  Do not sign important papers (legal documents).  Do not drink alcohol.  Do not take sleeping pills.  Do not take care of children by yourself.  Do not do activities where you need to climb or be in high places, like working on a ladder.  Do not go to a lake, river, ocean, swimming pool, or hot tub.  Keep your opioids locked up or in a place where children cannot reach them.  Do not share your pain medicine with anyone.  Stopping your use of opioids  If you have been taking opioids for more than a few weeks, you may need to slowly decrease (taper) how much you take until you stop taking them. Doing this can lower your chance of having symptoms.   Symptoms that come from suddenly stopping the use of opioids include:  Pain and cramping in your belly (abdomen).  Feeling sick to your stomach (nausea).z  Sweating.  Feeling very sleepy.  Feeling restless.  Shaking you cannot control (tremors).  Cravings for the medicine.  Do not try to stop taking them by yourself. Work with your doctor to stop. Your doctor will help you take less until you are not taking the medicine at all.  Getting rid of unused pills  Do not save any pills that you did not  use. Get rid of the pills by:  Taking them to a take-back program in your area.  Bringing them to a pharmacy that receives unused pills.  Flushing them down the toilet. Check the label or package insert of your medicine to see whether this is safe to do.  Throwing them in the trash. Check the label or package insert of your medicine to see whether this is safe to do. If it is safe to throw them out:  Take the pills out of their container.  Put the pills into a container you can seal.  Mix the pills with used coffee grounds, food scraps, dirt, or cat litter.  Put this in the trash.  Follow these instructions at home:  Activity  Do exercises as told by your doctor.  Avoid doing things that make your pain worse.  Return to your normal activities as told by your doctor. Ask your doctor what activities are safe for you.  General instructions  You may need to take these actions to prevent or treat constipation:  Drink enough fluid to keep your pee (urine) pale yellow.  Take over-the-counter or prescription medicines.  Eat foods that are high in fiber. These include beans, whole grains, and fresh fruits and vegetables.  Limit foods that are high in fat and sugar. These include fried or sweet foods.  Keep all follow-up visits.  Where to find support  If you have been taking opioids for a long time, get help from a local support group or counselor. Ask your doctor about this.  Where to find more information  Centers for Disease Control and Prevention (CDC): www.cdc.gov  [place] Food and Drug Administration (FDA): www.fda.gov  Get help right away if:  You may have taken too much of an opioid (overdosed). Common symptoms of an overdose:  Your breathing is slower or more shallow than normal.  You have a very slow heartbeat.  Your speech is not normal.  You vomit or you feel as if you may vomit.  The black centers of your eyes (pupils) are smaller than normal.  You have other potential symptoms:  You feel very confused.  You  faint.  You are very sleepy.  You have cold skin.  You have blue lips or fingernails.  You have thoughts of harming yourself or harming others.  These symptoms may be an emergency. Get help right away. Call your local emergency services (953 in the [place]  Do not wait to see if the symptoms will go away.  Do not drive yourself to the hospital.  Get help right away if you feel like you may hurt yourself or others, or have thoughts about taking your own life. Go to your nearest emergency room or:  Call your local emergency services (447 in the [place]  Call the National Poison Control Center at 1-230.660.6820.  Call a suicide crisis helpline, such as the National Suicide Prevention Lifeline at 1-119.331.2182 or 352 in the [place] This is open 24 hours a day.  Text the Crisis Text Line at 604780.  Summary  Opioid are strong medicines that are used to treat bad or very bad pain.  A pain treatment plan is a plan made by you and your doctor. Work with your doctor to make a plan for treating your pain.  If you think that you or someone else may have taken too much of an opioid, get help right away.  This information is not intended to replace advice given to you by your health care provider. Make sure you discuss any questions you have with your health care provider.  Document Revised: 07/13/2022 Document Reviewed: 03/30/2022  Elsevier Patient Education © 2024 [employer]

## 2024-12-19 NOTE — ASSESSMENT & PLAN NOTE
Orders:    diazePAM (VALIUM) 5 MG tablet; Take 1 tablet by mouth 2 (Two) Times a Day As Needed for Anxiety.  Fei and UDS up to date and appropriate.

## 2024-12-19 NOTE — PROGRESS NOTES
"Chief Complaint   Patient presents with    Med Refill     Valium, Gabapentin , Norco    Anxiety     Compliance visit        Subjective   Garland Luong is a 59 y.o. male who presents today for the following:    HPI   Right shoulder surgery, PT starts on 30th.patient has had follow up with surgeon.      Anxiety  follow-up visit.  Symptoms include palpitations.  Patient reports no suicidal ideas. Past treatments include benzodiazephines and SSRIs. The treatment provided significant relief.      Neck pain- Patient has a history of neck surgery. Patient takes norco for pain as needed. Denies any side effects from medication. Patient also has a history of chronic back pain. He has had multiple back surgeries.        Allergies   Allergen Reactions    Aspirin GI Intolerance     HISTORY OF ULCERS    Ibuprofen GI Intolerance     HISTORY OF ULCERS     Nsaids GI Intolerance     HISTORY OF ULCERS         OBJECTIVE:  Vitals:    12/19/24 1309   BP: 115/75   BP Location: Left arm   Patient Position: Sitting   Cuff Size: Adult   Pulse: 84   Resp: 17   Temp: 97.7 °F (36.5 °C)   TempSrc: Infrared   SpO2: 97%   Weight: 104 kg (229 lb)   Height: 188 cm (74\")     Physical Exam  Vitals and nursing note reviewed.   Constitutional:       Appearance: Normal appearance.   HENT:      Head: Normocephalic and atraumatic.      Nose: Nose normal.   Cardiovascular:      Rate and Rhythm: Normal rate and regular rhythm.   Pulmonary:      Breath sounds: Normal breath sounds. No wheezing or rhonchi.   Abdominal:      General: Bowel sounds are normal.   Musculoskeletal:      Right shoulder: Decreased range of motion. Right arm in a sling.      Cervical back: Decreased range of motion.      Thoracic back: Decreased range of motion.      Lumbar back: Decreased range of motion.   Skin:     General: Skin is warm and dry.   Neurological:      Mental Status: He is alert and oriented to person, place, and time.   Psychiatric:         Mood and Affect: Mood " normal.         Behavior: Behavior normal.         Thought Content: Thought content normal.         Judgment: Judgment normal.              ASSESSMENT/ PLAN:  Assessment & Plan  Anxiety    Orders:    diazePAM (VALIUM) 5 MG tablet; Take 1 tablet by mouth 2 (Two) Times a Day As Needed for Anxiety.  Fei and UDS up to date and appropriate.   Chronic neck pain    Orders:    HYDROcodone-acetaminophen (NORCO) 7.5-325 MG per tablet; Take 1 tablet by mouth Every 12 (Twelve) Hours As Needed for Moderate Pain for up to 30 days.    gabapentin (NEURONTIN) 300 MG capsule; Take 1 capsule by mouth 3 (Three) Times a Day.    Fei and UDS up to date and appropriate.     Procedures     Management Plan:     An After Visit Summary was printed and given to the patient at discharge.    Follow-up: Return in about 3 months (around 3/19/2025) for anxiety/chronic pain .         Marylou Gallardo, HUMBLE 12/20/2024 08:08 CST  This note was electronically signed.

## 2025-03-12 ENCOUNTER — OFFICE VISIT (OUTPATIENT)
Dept: FAMILY MEDICINE CLINIC | Facility: CLINIC | Age: 60
End: 2025-03-12
Payer: MEDICARE

## 2025-03-12 VITALS
WEIGHT: 241.2 LBS | OXYGEN SATURATION: 98 % | HEIGHT: 74 IN | HEART RATE: 69 BPM | SYSTOLIC BLOOD PRESSURE: 122 MMHG | BODY MASS INDEX: 30.96 KG/M2 | TEMPERATURE: 97.9 F | DIASTOLIC BLOOD PRESSURE: 76 MMHG | RESPIRATION RATE: 18 BRPM

## 2025-03-12 DIAGNOSIS — Z00.00 WELLNESS EXAMINATION: Primary | ICD-10-CM

## 2025-03-12 DIAGNOSIS — Z12.5 SCREENING FOR PROSTATE CANCER: ICD-10-CM

## 2025-03-12 DIAGNOSIS — Z13.1 SCREENING FOR DIABETES MELLITUS: ICD-10-CM

## 2025-03-12 DIAGNOSIS — Z11.59 ENCOUNTER FOR HEPATITIS C SCREENING TEST FOR LOW RISK PATIENT: ICD-10-CM

## 2025-03-12 DIAGNOSIS — M54.2 CHRONIC NECK PAIN: Chronic | ICD-10-CM

## 2025-03-12 DIAGNOSIS — Z13.6 SCREENING FOR CARDIOVASCULAR CONDITION: ICD-10-CM

## 2025-03-12 DIAGNOSIS — G89.29 CHRONIC NECK PAIN: Chronic | ICD-10-CM

## 2025-03-12 DIAGNOSIS — F41.9 ANXIETY: Chronic | ICD-10-CM

## 2025-03-12 RX ORDER — GABAPENTIN 300 MG/1
300 CAPSULE ORAL 3 TIMES DAILY
Qty: 90 CAPSULE | Refills: 2 | Status: SHIPPED | OUTPATIENT
Start: 2025-03-12

## 2025-03-12 RX ORDER — TAMSULOSIN HYDROCHLORIDE 0.4 MG/1
1 CAPSULE ORAL DAILY
Qty: 30 CAPSULE | Refills: 5 | Status: SHIPPED | OUTPATIENT
Start: 2025-03-12 | End: 2025-09-08

## 2025-03-12 RX ORDER — DIAZEPAM 5 MG/1
5 TABLET ORAL 2 TIMES DAILY PRN
Qty: 60 TABLET | Refills: 0 | Status: SHIPPED | OUTPATIENT
Start: 2025-03-12

## 2025-03-12 NOTE — PROGRESS NOTES
"Chief Complaint   Patient presents with    Anxiety     3 mth Compliance visit    Pain        Subjective   Garland Luong is a 60 y.o. male who presents today for the following:    Anxiety  Presents for follow-up visit.      Anxiety  follow-up visit.  Symptoms include palpitations.  Patient reports no suicidal ideas. Past treatments include benzodiazephines and SSRIs. The treatment provided significant relief. Patient only takes Valium as needed.      Neck pain- Patient has a history of neck surgery. Patient takes norco for pain as needed. Denies any side effects from medication. Patient also has a history of chronic back pain. He has had multiple back surgeries. He takes Norco and Gabapentin as needed for pain and neuropathy. Patient only takes medication as needed.     Allergies   Allergen Reactions    Aspirin GI Intolerance     HISTORY OF ULCERS    Ibuprofen GI Intolerance     HISTORY OF ULCERS     Nsaids GI Intolerance     HISTORY OF ULCERS         OBJECTIVE:  Vitals:    03/12/25 1102   BP: 122/76   BP Location: Left arm   Patient Position: Sitting   Cuff Size: Large Adult   Pulse: 69   Resp: 18   Temp: 97.9 °F (36.6 °C)   TempSrc: Infrared   SpO2: 98%   Weight: 109 kg (241 lb 3.2 oz)   Height: 188 cm (74\")     Physical Exam  Vitals and nursing note reviewed.   Constitutional:       Appearance: Normal appearance.   HENT:      Head: Normocephalic and atraumatic.      Nose: Nose normal.   Cardiovascular:      Rate and Rhythm: Normal rate and regular rhythm.   Pulmonary:      Breath sounds: Normal breath sounds. No wheezing or rhonchi.   Abdominal:      General: Bowel sounds are normal.   Musculoskeletal:      Right shoulder: Decreased range of motion.     Cervical back: Decreased range of motion.      Thoracic back: Decreased range of motion.      Lumbar back: Decreased range of motion.   Skin:     General: Skin is warm and dry.   Neurological:      Mental Status: He is alert and oriented to person, place, and " time.   Psychiatric:         Mood and Affect: Mood normal.         Behavior: Behavior normal.         Thought Content: Thought content normal.         Judgment: Judgment normal.              ASSESSMENT/ PLAN:  Assessment & Plan  Anxiety    Orders:    diazePAM (VALIUM) 5 MG tablet; Take 1 tablet by mouth 2 (Two) Times a Day As Needed for Anxiety.  Fei and UDS up to date and appropriate. Patient has been educated about how not to take pain medication with valium.   Chronic neck pain    Orders:    gabapentin (NEURONTIN) 300 MG capsule; Take 1 capsule by mouth 3 (Three) Times a Day.    Wellness examination    Orders:    CBC & Differential; Future    Comprehensive Metabolic Panel; Future    Hemoglobin A1c; Future    PSA Screen; Future    TSH Rfx On Abnormal To Free T4; Future    Screening for diabetes mellitus         Screening for cardiovascular condition    Orders:    Lipid Panel With LDL / HDL Ratio; Future    Screening for prostate cancer    Orders:    PSA Screen; Future    Encounter for hepatitis C screening test for low risk patient    Orders:    Hepatitis C Antibody; Future         Procedures     Management Plan:     An After Visit Summary was printed and given to the patient at discharge.    Follow-up: Return in about 1 month (around 4/12/2025) for Annual physical.         Marylou Gallardo, HUMBLE 3/12/2025 13:15 CDT  This note was electronically signed.

## 2025-03-12 NOTE — ASSESSMENT & PLAN NOTE
Orders:    diazePAM (VALIUM) 5 MG tablet; Take 1 tablet by mouth 2 (Two) Times a Day As Needed for Anxiety.  Fei and UDS up to date and appropriate. Patient has been educated about how not to take pain medication with valium.

## 2025-03-27 ENCOUNTER — TELEPHONE (OUTPATIENT)
Dept: FAMILY MEDICINE CLINIC | Facility: CLINIC | Age: 60
End: 2025-03-27

## 2025-03-27 NOTE — TELEPHONE ENCOUNTER
Caller: Garland Luong    Relationship to patient: Self    Best call back number: [phone]       Patient is needing: CAN WE REQUEST HIS LABS FROM [facility]? PLEASE CALL PATIENT TO UPDATE       LAB NUMBER [phone]

## 2025-05-09 ENCOUNTER — OFFICE VISIT (OUTPATIENT)
Dept: FAMILY MEDICINE CLINIC | Facility: CLINIC | Age: 60
End: 2025-05-09
Payer: MEDICARE

## 2025-05-09 VITALS
HEIGHT: 74 IN | HEART RATE: 85 BPM | WEIGHT: 237 LBS | OXYGEN SATURATION: 98 % | SYSTOLIC BLOOD PRESSURE: 134 MMHG | TEMPERATURE: 98.5 F | DIASTOLIC BLOOD PRESSURE: 88 MMHG | RESPIRATION RATE: 17 BRPM | BODY MASS INDEX: 30.42 KG/M2

## 2025-05-09 DIAGNOSIS — M54.2 CHRONIC NECK PAIN: Chronic | ICD-10-CM

## 2025-05-09 DIAGNOSIS — G89.29 CHRONIC NECK PAIN: Chronic | ICD-10-CM

## 2025-05-09 DIAGNOSIS — M54.50 CHRONIC BILATERAL LOW BACK PAIN WITHOUT SCIATICA: Primary | ICD-10-CM

## 2025-05-09 DIAGNOSIS — G89.29 CHRONIC BILATERAL LOW BACK PAIN WITHOUT SCIATICA: Primary | ICD-10-CM

## 2025-05-09 DIAGNOSIS — I10 PRIMARY HYPERTENSION: ICD-10-CM

## 2025-05-09 DIAGNOSIS — F41.9 ANXIETY: Chronic | ICD-10-CM

## 2025-05-09 RX ORDER — GABAPENTIN 300 MG/1
300 CAPSULE ORAL 3 TIMES DAILY
Qty: 90 CAPSULE | Refills: 2 | Status: SHIPPED | OUTPATIENT
Start: 2025-05-09

## 2025-05-09 RX ORDER — DIAZEPAM 5 MG/1
5 TABLET ORAL 2 TIMES DAILY PRN
Qty: 60 TABLET | Refills: 0 | Status: SHIPPED | OUTPATIENT
Start: 2025-05-09

## 2025-05-09 RX ORDER — ATENOLOL 50 MG/1
50 TABLET ORAL DAILY
Qty: 90 TABLET | Refills: 2 | Status: SHIPPED | OUTPATIENT
Start: 2025-05-09

## 2025-05-09 RX ORDER — HYDROCODONE BITARTRATE AND ACETAMINOPHEN 10; 325 MG/1; MG/1
1 TABLET ORAL EVERY 12 HOURS PRN
Qty: 60 TABLET | Refills: 0 | Status: SHIPPED | OUTPATIENT
Start: 2025-05-09 | End: 2025-06-08

## 2025-05-09 RX ORDER — ATENOLOL 50 MG/1
50 TABLET ORAL DAILY
COMMUNITY
End: 2025-05-09 | Stop reason: SDUPTHER

## 2025-05-09 RX ORDER — TRIAMCINOLONE ACETONIDE 40 MG/ML
80 INJECTION, SUSPENSION INTRA-ARTICULAR; INTRAMUSCULAR ONCE
Status: COMPLETED | OUTPATIENT
Start: 2025-05-09 | End: 2025-05-09

## 2025-05-09 RX ADMIN — TRIAMCINOLONE ACETONIDE 80 MG: 40 INJECTION, SUSPENSION INTRA-ARTICULAR; INTRAMUSCULAR at 11:02

## 2025-05-09 NOTE — PROGRESS NOTES
"Chief Complaint   Patient presents with    Back Pain    Anxiety        Subjective   Garland Luong is a 60 y.o. male who presents today for the following       [facility]  Anxiety  follow-up visit.  Symptoms include palpitations.  Patient reports no suicidal ideas. Past treatments include benzodiazephines and SSRIs. The treatment provided significant relief.   Neck pain- Patient has a history of neck surgery. Patient takes norco for pain as needed. Denies any side effects from medication.   chronic back pain. He has had multiple back surgeries.   Step in a hole and hurt his low back. Now its radiating down his legs  Norco and flexeril helped with the neck pain but his back is still hurting.   HTN- Controlled on medications     Allergies   Allergen Reactions    Aspirin GI Intolerance     HISTORY OF ULCERS    Ibuprofen GI Intolerance     HISTORY OF ULCERS     Nsaids GI Intolerance     HISTORY OF ULCERS         OBJECTIVE:  Vitals:    05/09/25 1037 05/09/25 1045   BP: 163/74 134/88   BP Location: Left arm Left arm   Patient Position: Sitting Sitting   Cuff Size: Adult Large Adult   Pulse: 72 85   Resp: 17    Temp: 98.5 °F (36.9 °C)    TempSrc: Infrared    SpO2: 98%    Weight: 108 kg (237 lb)    Height: 188 cm (74\")      Physical Exam  Vitals and nursing note reviewed.   Cardiovascular:      Rate and Rhythm: Normal rate and regular rhythm.   Pulmonary:      Effort: Pulmonary effort is normal.      Breath sounds: Normal breath sounds.   Musculoskeletal:      Cervical back: Decreased range of motion.      Thoracic back: Decreased range of motion.      Lumbar back: Decreased range of motion.   Neurological:      Mental Status: He is alert.   Psychiatric:         Mood and Affect: Mood normal.         Behavior: Behavior normal.                    ASSESSMENT/ PLAN:    Diagnoses and all orders for this visit:    1. Chronic bilateral low back pain without sciatica (Primary)  -     triamcinolone acetonide (KENALOG-40) injection 80 mg  - "     HYDROcodone-acetaminophen (NORCO)  MG per tablet; Take 1 tablet by mouth Every 12 (Twelve) Hours As Needed for Moderate Pain for up to 30 days. for pain  Dispense: 60 tablet; Refill: 0    2. Anxiety  -     diazePAM (VALIUM) 5 MG tablet; Take 1 tablet by mouth 2 (Two) Times a Day As Needed for Anxiety.  Dispense: 60 tablet; Refill: 0  -     FLUoxetine (PROzac) 20 MG capsule; Take 1 capsule by mouth Daily.  Dispense: 90 capsule; Refill: 3    3. Chronic neck pain  -     HYDROcodone-acetaminophen (NORCO)  MG per tablet; Take 1 tablet by mouth Every 12 (Twelve) Hours As Needed for Moderate Pain for up to 30 days. for pain  Dispense: 60 tablet; Refill: 0  -     gabapentin (NEURONTIN) 300 MG capsule; Take 1 capsule by mouth 3 (Three) Times a Day.  Dispense: 90 capsule; Refill: 2    4. Primary hypertension  -     atenolol (TENORMIN) 50 MG tablet; Take 1 tablet by mouth Daily.  Dispense: 90 tablet; Refill: 2    Fei and SVITLANA up to date and appropriate.     Procedures       Follow-up: Return in about 3 months (around 8/9/2025) for Chronic pain/anxiety .      HUMBLE Mejia 5/9/2025 13:50 CDT  This note was electronically signed.

## 2025-06-06 ENCOUNTER — TELEPHONE (OUTPATIENT)
Dept: FAMILY MEDICINE CLINIC | Facility: CLINIC | Age: 60
End: 2025-06-06

## 2025-06-06 NOTE — TELEPHONE ENCOUNTER
Caller: Garland Luong    Relationship: Self    Best call back number: [phone]    What was the call regarding: PATIENT STATES THE SHOT HE RECEIVED HELPED WITH HIS BACK PAIN. HE SAID HIS BACK IS NORMAL AGAIN.  
2 = A lot of assistance

## 2025-06-18 ENCOUNTER — OFFICE VISIT (OUTPATIENT)
Dept: FAMILY MEDICINE CLINIC | Facility: CLINIC | Age: 60
End: 2025-06-18
Payer: MEDICARE

## 2025-06-18 VITALS
DIASTOLIC BLOOD PRESSURE: 80 MMHG | BODY MASS INDEX: 29.26 KG/M2 | TEMPERATURE: 97 F | HEART RATE: 90 BPM | SYSTOLIC BLOOD PRESSURE: 132 MMHG | HEIGHT: 74 IN | RESPIRATION RATE: 17 BRPM | OXYGEN SATURATION: 98 % | WEIGHT: 228 LBS

## 2025-06-18 DIAGNOSIS — B02.9 HERPES ZOSTER WITHOUT COMPLICATION: Primary | ICD-10-CM

## 2025-06-18 DIAGNOSIS — F41.9 ANXIETY: Chronic | ICD-10-CM

## 2025-06-18 PROCEDURE — 1159F MED LIST DOCD IN RCRD: CPT | Performed by: NURSE PRACTITIONER

## 2025-06-18 PROCEDURE — 3079F DIAST BP 80-89 MM HG: CPT | Performed by: NURSE PRACTITIONER

## 2025-06-18 PROCEDURE — 99213 OFFICE O/P EST LOW 20 MIN: CPT | Performed by: NURSE PRACTITIONER

## 2025-06-18 PROCEDURE — 3075F SYST BP GE 130 - 139MM HG: CPT | Performed by: NURSE PRACTITIONER

## 2025-06-18 PROCEDURE — 1125F AMNT PAIN NOTED PAIN PRSNT: CPT | Performed by: NURSE PRACTITIONER

## 2025-06-18 PROCEDURE — 1160F RVW MEDS BY RX/DR IN RCRD: CPT | Performed by: NURSE PRACTITIONER

## 2025-06-18 RX ORDER — ACYCLOVIR 400 MG/1
400 TABLET ORAL
Qty: 35 TABLET | Refills: 0 | Status: SHIPPED | OUTPATIENT
Start: 2025-06-18 | End: 2025-06-25

## 2025-06-18 NOTE — PROGRESS NOTES
"Chief Complaint   Patient presents with    Leg Pain     R leg        Subjective   Garland Luong is a 60 y.o. male who presents today for the following     Knee Pain   There was no injury mechanism. Pain location: right knee then radiating to right ankle and calf muscle. The pain is moderate. The pain has been Improving since onset. Nothing aggravates the symptoms. He has tried ice and rest (increasing gabapentin helped symptoms.) for the symptoms. The treatment provided mild relief.   Patient denies swelling or redness to right leg.      Depression/anxiety- Patient is currently on prozac. Denies side effects.     Allergies   Allergen Reactions    Aspirin GI Intolerance     HISTORY OF ULCERS    Ibuprofen GI Intolerance     HISTORY OF ULCERS     Nsaids GI Intolerance     HISTORY OF ULCERS         OBJECTIVE:  Vitals:    06/18/25 1321   BP: 132/80   BP Location: Left arm   Patient Position: Sitting   Cuff Size: Large Adult   Pulse: 90   Resp: 17   Temp: 97 °F (36.1 °C)   TempSrc: Infrared   SpO2: 98%   Weight: 103 kg (228 lb)   Height: 188 cm (74\")     Physical Exam  Vitals and nursing note reviewed.   Constitutional:       Appearance: Normal appearance.   HENT:      Head: Normocephalic and atraumatic.      Nose: Nose normal.   Eyes:      Pupils: Pupils are equal, round, and reactive to light.   Cardiovascular:      Rate and Rhythm: Normal rate and regular rhythm.   Pulmonary:      Breath sounds: Normal breath sounds. No wheezing or rhonchi.   Abdominal:      General: Bowel sounds are normal.   Musculoskeletal:      Right lower leg: Normal. No swelling or deformity. No edema.   Skin:     General: Skin is warm and dry.   Neurological:      Mental Status: He is alert and oriented to person, place, and time.   Psychiatric:         Mood and Affect: Mood normal.         Behavior: Behavior normal.                    ASSESSMENT/ PLAN:    Diagnoses and all orders for this visit:    1. Herpes zoster without complication " (Primary)  -     acyclovir (ZOVIRAX) 400 MG tablet; Take 1 tablet by mouth 5 (Five) Times a Day for 7 days. Take no more than 5 doses a day.  Dispense: 35 tablet; Refill: 0    2. Anxiety  -     FLUoxetine (PROzac) 20 MG capsule; Take 1 capsule by mouth Daily.  Dispense: 90 capsule; Refill: 3    Continue to take gabapentin for nerve pain.     Procedures       Follow-up: Return in about 3 months (around 9/18/2025).      HUMBLE Mejia 6/18/2025 16:26 CDT  This note was electronically signed.

## 2025-08-21 ENCOUNTER — OFFICE VISIT (OUTPATIENT)
Dept: FAMILY MEDICINE CLINIC | Facility: CLINIC | Age: 60
End: 2025-08-21
Payer: MEDICARE

## 2025-08-21 VITALS
SYSTOLIC BLOOD PRESSURE: 122 MMHG | HEIGHT: 74 IN | BODY MASS INDEX: 29.39 KG/M2 | WEIGHT: 229 LBS | HEART RATE: 57 BPM | RESPIRATION RATE: 18 BRPM | TEMPERATURE: 97.8 F | DIASTOLIC BLOOD PRESSURE: 68 MMHG | OXYGEN SATURATION: 99 %

## 2025-08-21 DIAGNOSIS — M54.41 CHRONIC BILATERAL LOW BACK PAIN WITH BILATERAL SCIATICA: Chronic | ICD-10-CM

## 2025-08-21 DIAGNOSIS — M54.42 CHRONIC BILATERAL LOW BACK PAIN WITH BILATERAL SCIATICA: Chronic | ICD-10-CM

## 2025-08-21 DIAGNOSIS — F41.9 ANXIETY: Primary | Chronic | ICD-10-CM

## 2025-08-21 DIAGNOSIS — M54.2 CHRONIC NECK PAIN: Chronic | ICD-10-CM

## 2025-08-21 DIAGNOSIS — G89.29 CHRONIC BILATERAL LOW BACK PAIN WITH BILATERAL SCIATICA: Chronic | ICD-10-CM

## 2025-08-21 DIAGNOSIS — G89.29 CHRONIC NECK PAIN: Chronic | ICD-10-CM

## 2025-08-21 DIAGNOSIS — L98.9 SKIN LESION: ICD-10-CM

## 2025-08-21 PROCEDURE — 3078F DIAST BP <80 MM HG: CPT | Performed by: NURSE PRACTITIONER

## 2025-08-21 PROCEDURE — 3074F SYST BP LT 130 MM HG: CPT | Performed by: NURSE PRACTITIONER

## 2025-08-21 PROCEDURE — 1125F AMNT PAIN NOTED PAIN PRSNT: CPT | Performed by: NURSE PRACTITIONER

## 2025-08-21 PROCEDURE — 99214 OFFICE O/P EST MOD 30 MIN: CPT | Performed by: NURSE PRACTITIONER

## 2025-08-21 RX ORDER — HYDROCODONE BITARTRATE AND ACETAMINOPHEN 10; 325 MG/1; MG/1
1 TABLET ORAL EVERY 12 HOURS PRN
Qty: 60 TABLET | Refills: 0 | Status: SHIPPED | OUTPATIENT
Start: 2025-08-21

## 2025-08-21 RX ORDER — GABAPENTIN 300 MG/1
300 CAPSULE ORAL 3 TIMES DAILY
Qty: 90 CAPSULE | Refills: 2 | Status: SHIPPED | OUTPATIENT
Start: 2025-08-21

## 2025-08-21 RX ORDER — DIAZEPAM 5 MG/1
5 TABLET ORAL 2 TIMES DAILY PRN
Qty: 60 TABLET | Refills: 0 | Status: SHIPPED | OUTPATIENT
Start: 2025-08-21

## (undated) DEVICE — CATH IV ANGIO FEP 12G 3IN LTBLU 10PK

## (undated) DEVICE — SCANLAN® SURG-I-PAW® INSTRUMENT COVERS - RED, 1/10" X 5"/ 3 MMX13 CM (2 - 5" PCS /PKG): Brand: SCANLAN® SURG-I-PAW® INSTRUMENT COVERS

## (undated) DEVICE — PK TURNOVER RM ADV

## (undated) DEVICE — PK SPINE CERV ANT 30

## (undated) DEVICE — YANKAUER SUCTION INSTRUMENT WITHOUT CONTROL VENT, OPEN TIP, CLEAR: Brand: YANKAUER

## (undated) DEVICE — ELECTRD NDL EDGE/INSUL/PFTE.787MM 2.84IN

## (undated) DEVICE — GLV SURG NEOLON 2G PF LF 7.5 STRL

## (undated) DEVICE — GLV SURG SENSICARE W/ALOE PF LF 7.5 STRL

## (undated) DEVICE — RESERVOIR,SUCTION,100CC,SILICONE: Brand: MEDLINE

## (undated) DEVICE — SUT PROLN 5/0 C1 DA 24IN 8725H

## (undated) DEVICE — DRAPE,UTILITY,TAPE,15X26,STERILE: Brand: MEDLINE

## (undated) DEVICE — 3.0MM PRECISION NEURO (MATCH HEAD)

## (undated) DEVICE — PACK,SET UP,NO DRAPES: Brand: MEDLINE

## (undated) DEVICE — GLV SURG BIOGEL LTX PF 6 1/2

## (undated) DEVICE — SPNG GZ WOVN 4X4IN 12PLY 10/BX STRL

## (undated) DEVICE — ANTIBACTERIAL UNDYED BRAIDED (POLYGLACTIN 910), SYNTHETIC ABSORBABLE SUTURE: Brand: COATED VICRYL

## (undated) DEVICE — HALTR TRACT HD DLX PAD UNIV

## (undated) DEVICE — SUT SILK 2/0 SH 75CM 30IN BLK C016D

## (undated) DEVICE — GOWN,NON-REINFORCED,SIRUS,SET IN SLV,XXL: Brand: MEDLINE

## (undated) DEVICE — GLV SURG NEOLON 2G PF LF 8 STRL

## (undated) DEVICE — GLV SURG SENSICARE W/ALOE PF LF 8 STRL

## (undated) DEVICE — SUT MNCRYL 4/0 PS2 27IN UD MCP426H

## (undated) DEVICE — COVER,MAYO STAND,STERILE: Brand: MEDLINE

## (undated) DEVICE — SUT SILK 3/0 SUTUPAK TIES 24IN SA74H

## (undated) DEVICE — 4-PORT MANIFOLD: Brand: NEPTUNE 2

## (undated) DEVICE — WIPE THERAWASH SLV SPEC CARE 2PK

## (undated) DEVICE — LP VESL MAXI RED 2PK

## (undated) DEVICE — TOTAL TRAY, 16FR 10ML SIL FOLEY, URN: Brand: MEDLINE

## (undated) DEVICE — APPL CHLORAPREP W/TINT 26ML ORNG

## (undated) DEVICE — SUT SILK 2/0 SUTUPAK TIES 24IN SA75H

## (undated) DEVICE — SPONGE,LAP,12"X12",XR,ST,5/PK,40PK/CS: Brand: MEDLINE

## (undated) DEVICE — GLV SURG BIOGEL LTX PF 7 1/2

## (undated) DEVICE — SUT PDS 0 CTX 36IN VIO PDP370T

## (undated) DEVICE — PK SPINE POST 30

## (undated) DEVICE — GOWN,NON-REINFORCED,SIRUS,SET IN SLV,XL: Brand: MEDLINE

## (undated) DEVICE — GLV SURG TRIUMPH GREEN W/ALOE PF LTX 8 STRL

## (undated) DEVICE — SUT PROLN 7/0 BV1 30IN 4PK M8703

## (undated) DEVICE — SUT PDS 1 CTX 36IN VIO PDP371T

## (undated) DEVICE — DRP C/ARMOR

## (undated) DEVICE — DRSNG SURESITE WNDW 4X4.5

## (undated) DEVICE — ELECTRD BLD EXT EDGE 1P COAT 6.5IN STRL

## (undated) DEVICE — GLV SURG SENSICARE MICRO PF LF 9 STRL

## (undated) DEVICE — CLTH CLENS READYCLEANSE PERI CARE PK/5

## (undated) DEVICE — GLV SURG TRIUMPH GREEN W/ALOE PF LTX 7.5 STRL

## (undated) DEVICE — SPNG DISSCT SECTO KTTNER PK/5

## (undated) DEVICE — GOWN,PREVENTION PLUS,XLONG/XLARGE,STRL: Brand: MEDLINE

## (undated) DEVICE — DRAPE,TOP,102X53,STERILE: Brand: MEDLINE

## (undated) DEVICE — Device

## (undated) DEVICE — PIN DISTRACT TI 14MM STRL

## (undated) DEVICE — TP SILK DURAPORE 3IN

## (undated) DEVICE — SPNG DISSCT CHRRY 3/8IN STRL PK/5

## (undated) DEVICE — SUT SILK 4/0 SUTUPAK TIES 24IN SA73H

## (undated) DEVICE — SUT SILK 0 SUTUPAK TIES 24IN SA76G